# Patient Record
Sex: FEMALE | Race: BLACK OR AFRICAN AMERICAN | HISPANIC OR LATINO | Employment: FULL TIME | URBAN - METROPOLITAN AREA
[De-identification: names, ages, dates, MRNs, and addresses within clinical notes are randomized per-mention and may not be internally consistent; named-entity substitution may affect disease eponyms.]

---

## 2021-05-18 ENCOUNTER — OFFICE VISIT (OUTPATIENT)
Dept: OBGYN CLINIC | Facility: CLINIC | Age: 26
End: 2021-05-18

## 2021-05-18 VITALS
HEIGHT: 63 IN | SYSTOLIC BLOOD PRESSURE: 111 MMHG | WEIGHT: 148.2 LBS | HEART RATE: 89 BPM | DIASTOLIC BLOOD PRESSURE: 69 MMHG | BODY MASS INDEX: 26.26 KG/M2

## 2021-05-18 DIAGNOSIS — Z01.419 ENCOUNTER FOR ANNUAL ROUTINE GYNECOLOGICAL EXAMINATION: Primary | ICD-10-CM

## 2021-05-18 PROCEDURE — G0145 SCR C/V CYTO,THINLAYER,RESCR: HCPCS | Performed by: NURSE PRACTITIONER

## 2021-05-18 PROCEDURE — 99385 PREV VISIT NEW AGE 18-39: CPT | Performed by: NURSE PRACTITIONER

## 2021-05-18 NOTE — LETTER
2021    To Blaine CASTANO: 1995      This letter is to advise you that your recent PAP SMEAR results were reviewed by me and are NORMAL    We will see you in 1 year for your annual exam     Jennifer Goodson

## 2021-05-18 NOTE — PATIENT INSTRUCTIONS
PAP results can take up to 2 weeks  Call with needs or concerns  Return in 1 year    COVID-19 Instructions    If you are having any of the following:  Cough   Shortness of breath   Fever  If traveled within past 2 weeks internationally or to high risk [place] states  Or been in contact with someone that has     Please call either:   Your PCP office  [phone], option [phone]    They will screen you over the phone and direct you to the nearest appropriate testing location    DO NOT go to your PCP or OB office without calling first

## 2021-05-18 NOTE — PROGRESS NOTES
Annual Exam    Assessment   1  Encounter for annual routine gynecological examination  Liquid-based pap, screening     well woman       Plan       All questions answered  Breast self exam technique reviewed and patient encouraged to perform self-exam monthly  Contraception: abstinence  Discussed healthy lifestyle modifications  Follow up in 1 year  Thin prep Pap smear  Patient Instructions   PAP results can take up to 2 weeks  Call with needs or concerns  Return in 1 year  Pt verbalized understanding of all discussed  Subjective      Jorge Renteria is a 31 y o  No obstetric history on file  female who presents for annual well woman exam  Periods are regular every 28-30 days, lasting 5 days  No intermenstrual bleeding, spotting, or discharge  First PAP today  No current partner, last intercourse was 2 years ago, was using condoms    Current contraception: abstinence  History of abnormal Pap smear: First today  Family history of uterine or ovarian cancer: no  Regular self breast exam: yes  History of abnormal mammogram: N/A  Family history of breast cancer: no  History of abnormal lipids: unknown  Menstrual History:  OB History        0    Para   0    Term   0       0    AB   0    Living   0       SAB   0    TAB   0    Ectopic   0    Multiple   0    Live Births   0                Menarche age: 14  Patient's last menstrual period was 2021  Period Pattern: Regular    The following portions of the patient's history were reviewed and updated as appropriate: allergies, current medications, past family history, past medical history, past social history, past surgical history and problem list     Review of Systems  Pertinent items are noted in HPI        Objective      /69   Pulse 89   Ht 5' 3" (1 6 m)   Wt 67 2 kg (148 lb 3 2 oz)   LMP 2021   BMI 26 25 kg/m²     General: alert and oriented, in no acute distress, alert, appears stated age and cooperative   Heart: regular rate and rhythm, S1, S2 normal, no murmur, click, rub or gallop   Lungs: clear to auscultation bilaterally, WNL respiratory effort, negative cough or SOB   Thyroid: Negative masses   Abdomen: soft, non-tender, without masses or organomegaly   Vulva: normal   Vagina: normal mucosa   Cervix: no bleeding following Pap, no cervical motion tenderness and no lesions   Uterus: normal size, non-tender, normal shape and consistency   Adnexa: normal adnexa   Urethra: normal   Breasts: NT,negative masses, discharge, or dimpling

## 2021-05-21 LAB
LAB AP GYN PRIMARY INTERPRETATION: NORMAL
Lab: NORMAL

## 2021-06-01 ENCOUNTER — OFFICE VISIT (OUTPATIENT)
Dept: FAMILY MEDICINE CLINIC | Facility: CLINIC | Age: 26
End: 2021-06-01

## 2021-06-01 ENCOUNTER — APPOINTMENT (OUTPATIENT)
Dept: LAB | Facility: CLINIC | Age: 26
End: 2021-06-01
Payer: COMMERCIAL

## 2021-06-01 VITALS
WEIGHT: 148.9 LBS | SYSTOLIC BLOOD PRESSURE: 110 MMHG | TEMPERATURE: 97.3 F | OXYGEN SATURATION: 98 % | RESPIRATION RATE: 18 BRPM | HEIGHT: 63 IN | BODY MASS INDEX: 26.38 KG/M2 | DIASTOLIC BLOOD PRESSURE: 68 MMHG | HEART RATE: 82 BPM

## 2021-06-01 DIAGNOSIS — Z11.4 ENCOUNTER FOR SCREENING FOR HIV: ICD-10-CM

## 2021-06-01 DIAGNOSIS — M79.672 LEFT FOOT PAIN: Primary | ICD-10-CM

## 2021-06-01 DIAGNOSIS — Z11.59 ENCOUNTER FOR HEPATITIS C SCREENING TEST FOR LOW RISK PATIENT: ICD-10-CM

## 2021-06-01 DIAGNOSIS — E66.3 OVERWEIGHT (BMI 25.0-29.9): ICD-10-CM

## 2021-06-01 DIAGNOSIS — Z13.220 SCREENING FOR HYPERLIPIDEMIA: ICD-10-CM

## 2021-06-01 DIAGNOSIS — Z13.1 SCREENING FOR DIABETES MELLITUS (DM): ICD-10-CM

## 2021-06-01 LAB
ALBUMIN SERPL BCP-MCNC: 3.7 G/DL (ref 3.5–5)
ALP SERPL-CCNC: 49 U/L (ref 46–116)
ALT SERPL W P-5'-P-CCNC: 17 U/L (ref 12–78)
ANION GAP SERPL CALCULATED.3IONS-SCNC: 5 MMOL/L (ref 4–13)
AST SERPL W P-5'-P-CCNC: 19 U/L (ref 5–45)
BASOPHILS # BLD AUTO: 0.08 THOUSANDS/ΜL (ref 0–0.1)
BASOPHILS NFR BLD AUTO: 1 % (ref 0–1)
BILIRUB SERPL-MCNC: 0.78 MG/DL (ref 0.2–1)
BUN SERPL-MCNC: 14 MG/DL (ref 5–25)
CALCIUM SERPL-MCNC: 9.1 MG/DL (ref 8.3–10.1)
CHLORIDE SERPL-SCNC: 106 MMOL/L (ref 100–108)
CHOLEST SERPL-MCNC: 140 MG/DL (ref 50–200)
CO2 SERPL-SCNC: 27 MMOL/L (ref 21–32)
CREAT SERPL-MCNC: 0.7 MG/DL (ref 0.6–1.3)
EOSINOPHIL # BLD AUTO: 0.29 THOUSAND/ΜL (ref 0–0.61)
EOSINOPHIL NFR BLD AUTO: 4 % (ref 0–6)
ERYTHROCYTE [DISTWIDTH] IN BLOOD BY AUTOMATED COUNT: 11.9 % (ref 11.6–15.1)
GFR SERPL CREATININE-BSD FRML MDRD: 138 ML/MIN/1.73SQ M
GLUCOSE SERPL-MCNC: 59 MG/DL (ref 65–140)
HCT VFR BLD AUTO: 41.3 % (ref 34.8–46.1)
HCV AB SER QL: NORMAL
HDLC SERPL-MCNC: 55 MG/DL
HGB BLD-MCNC: 13.3 G/DL (ref 11.5–15.4)
IMM GRANULOCYTES # BLD AUTO: 0.02 THOUSAND/UL (ref 0–0.2)
IMM GRANULOCYTES NFR BLD AUTO: 0 % (ref 0–2)
LDLC SERPL CALC-MCNC: 69 MG/DL (ref 0–100)
LYMPHOCYTES # BLD AUTO: 2.25 THOUSANDS/ΜL (ref 0.6–4.47)
LYMPHOCYTES NFR BLD AUTO: 31 % (ref 14–44)
MCH RBC QN AUTO: 32.4 PG (ref 26.8–34.3)
MCHC RBC AUTO-ENTMCNC: 32.2 G/DL (ref 31.4–37.4)
MCV RBC AUTO: 101 FL (ref 82–98)
MONOCYTES # BLD AUTO: 0.62 THOUSAND/ΜL (ref 0.17–1.22)
MONOCYTES NFR BLD AUTO: 9 % (ref 4–12)
NEUTROPHILS # BLD AUTO: 3.93 THOUSANDS/ΜL (ref 1.85–7.62)
NEUTS SEG NFR BLD AUTO: 55 % (ref 43–75)
NONHDLC SERPL-MCNC: 85 MG/DL
NRBC BLD AUTO-RTO: 0 /100 WBCS
PLATELET # BLD AUTO: 339 THOUSANDS/UL (ref 149–390)
PMV BLD AUTO: 10.2 FL (ref 8.9–12.7)
POTASSIUM SERPL-SCNC: 3.5 MMOL/L (ref 3.5–5.3)
PROT SERPL-MCNC: 7.9 G/DL (ref 6.4–8.2)
RBC # BLD AUTO: 4.11 MILLION/UL (ref 3.81–5.12)
SODIUM SERPL-SCNC: 138 MMOL/L (ref 136–145)
TRIGL SERPL-MCNC: 78 MG/DL
TSH SERPL DL<=0.05 MIU/L-ACNC: 1.46 UIU/ML (ref 0.36–3.74)
WBC # BLD AUTO: 7.19 THOUSAND/UL (ref 4.31–10.16)

## 2021-06-01 PROCEDURE — 86803 HEPATITIS C AB TEST: CPT

## 2021-06-01 PROCEDURE — 80053 COMPREHEN METABOLIC PANEL: CPT

## 2021-06-01 PROCEDURE — 36415 COLL VENOUS BLD VENIPUNCTURE: CPT

## 2021-06-01 PROCEDURE — 1036F TOBACCO NON-USER: CPT | Performed by: FAMILY MEDICINE

## 2021-06-01 PROCEDURE — 99204 OFFICE O/P NEW MOD 45 MIN: CPT | Performed by: FAMILY MEDICINE

## 2021-06-01 PROCEDURE — 84443 ASSAY THYROID STIM HORMONE: CPT

## 2021-06-01 PROCEDURE — 87389 HIV-1 AG W/HIV-1&-2 AB AG IA: CPT

## 2021-06-01 PROCEDURE — 3008F BODY MASS INDEX DOCD: CPT | Performed by: FAMILY MEDICINE

## 2021-06-01 PROCEDURE — 85025 COMPLETE CBC W/AUTO DIFF WBC: CPT

## 2021-06-01 PROCEDURE — 80061 LIPID PANEL: CPT

## 2021-06-01 PROCEDURE — 3725F SCREEN DEPRESSION PERFORMED: CPT | Performed by: FAMILY MEDICINE

## 2021-06-01 NOTE — ASSESSMENT & PLAN NOTE
BMI Counseling: Body mass index is 26 38 kg/m²  The BMI is above normal  Nutrition recommendations include reducing portion sizes, decreasing overall calorie intake, 3-5 servings of fruits/vegetables daily, reducing fast food intake, consuming healthier snacks and decreasing soda and/or juice intake  Exercise recommendations include moderate aerobic physical activity for 150 minutes/week

## 2021-06-02 LAB — HIV 1+2 AB+HIV1 P24 AG SERPL QL IA: NORMAL

## 2021-06-25 ENCOUNTER — TELEPHONE (OUTPATIENT)
Dept: FAMILY MEDICINE CLINIC | Facility: CLINIC | Age: 26
End: 2021-06-25

## 2021-06-25 NOTE — TELEPHONE ENCOUNTER
Please schedule a NP appointment for podiatry for  LEFT FOOT PAIN  Patient is expecting your phone call

## 2021-09-21 ENCOUNTER — OFFICE VISIT (OUTPATIENT)
Dept: OBGYN CLINIC | Facility: CLINIC | Age: 26
End: 2021-09-21

## 2021-09-21 VITALS
HEART RATE: 72 BPM | DIASTOLIC BLOOD PRESSURE: 79 MMHG | WEIGHT: 151 LBS | SYSTOLIC BLOOD PRESSURE: 116 MMHG | BODY MASS INDEX: 26.75 KG/M2

## 2021-09-21 DIAGNOSIS — R35.0 FREQUENT URINATION: ICD-10-CM

## 2021-09-21 DIAGNOSIS — R30.0 DYSURIA: Primary | ICD-10-CM

## 2021-09-21 LAB
BACTERIA UR QL AUTO: ABNORMAL /HPF
BILIRUB UR QL STRIP: NEGATIVE
CLARITY UR: CLEAR
COLOR UR: YELLOW
GLUCOSE UR STRIP-MCNC: NEGATIVE MG/DL
HGB UR QL STRIP.AUTO: NEGATIVE
HYALINE CASTS #/AREA URNS LPF: ABNORMAL /LPF
KETONES UR STRIP-MCNC: NEGATIVE MG/DL
LEUKOCYTE ESTERASE UR QL STRIP: ABNORMAL
NITRITE UR QL STRIP: NEGATIVE
NON-SQ EPI CELLS URNS QL MICRO: ABNORMAL /HPF
PH UR STRIP.AUTO: 6.5 [PH]
PROT UR STRIP-MCNC: NEGATIVE MG/DL
RBC #/AREA URNS AUTO: ABNORMAL /HPF
SL AMB  POCT GLUCOSE, UA: NORMAL
SL AMB LEUKOCYTE ESTERASE,UA: NORMAL
SL AMB POCT BILIRUBIN,UA: NORMAL
SL AMB POCT BLOOD,UA: NORMAL
SL AMB POCT CLARITY,UA: CLEAR
SL AMB POCT COLOR,UA: YELLOW
SL AMB POCT KETONES,UA: NORMAL
SL AMB POCT NITRITE,UA: NORMAL
SL AMB POCT PH,UA: 5
SL AMB POCT SPECIFIC GRAVITY,UA: 1.01
SL AMB POCT URINE PROTEIN: NORMAL
SL AMB POCT UROBILINOGEN: NORMAL
SP GR UR STRIP.AUTO: 1.01 (ref 1–1.03)
UROBILINOGEN UR QL STRIP.AUTO: 0.2 E.U./DL
WBC #/AREA URNS AUTO: ABNORMAL /HPF

## 2021-09-21 PROCEDURE — 87077 CULTURE AEROBIC IDENTIFY: CPT | Performed by: NURSE PRACTITIONER

## 2021-09-21 PROCEDURE — 81002 URINALYSIS NONAUTO W/O SCOPE: CPT | Performed by: NURSE PRACTITIONER

## 2021-09-21 PROCEDURE — 1036F TOBACCO NON-USER: CPT | Performed by: NURSE PRACTITIONER

## 2021-09-21 PROCEDURE — 81001 URINALYSIS AUTO W/SCOPE: CPT | Performed by: NURSE PRACTITIONER

## 2021-09-21 PROCEDURE — 87086 URINE CULTURE/COLONY COUNT: CPT | Performed by: NURSE PRACTITIONER

## 2021-09-21 PROCEDURE — 87186 SC STD MICRODIL/AGAR DIL: CPT | Performed by: NURSE PRACTITIONER

## 2021-09-21 PROCEDURE — 99213 OFFICE O/P EST LOW 20 MIN: CPT | Performed by: NURSE PRACTITIONER

## 2021-09-21 NOTE — PROGRESS NOTES
Assessment/Plan:         Diagnoses and all orders for this visit:    Dysuria  -     POCT urine dip  -     Urine culture; Future  -     UA (URINE) with reflex to Scope; Future    Frequent urination  -     Urine culture; Future  -     UA (URINE) with reflex to Scope; Future      Plan  Increase water intake  Follow up with Family doctor if urinary symptoms persist  You will be called if urine sent to lab is positive for a urinary tract infection  Annual GYN exam is due in May  Pt verbalized understanding of all discussed  Subjective:      Patient ID: Bart Hyde is a 31 y o  female  HPI  Pt presents with C/O urinary frequency x several day abd intermittent burning with urination  Last WNL PAP was May 2021     Explained urine dip was negative  Advised pt to increase water intake  Explained urine would be sent to lab, if it is positive for UTI she would be called  Pt is to call Family doctor iUTI symptoms persist        The following portions of the patient's history were reviewed and updated as appropriate: allergies, current medications, past family history, past medical history, past social history, past surgical history and problem list     Review of Systems      Pertinent items are note in the HPI    Objective:      /79   Pulse 72   Wt 68 5 kg (151 lb)   LMP 09/14/2021 (Approximate)   BMI 26 75 kg/m²          Physical Exam  Vitals reviewed  Constitutional:       Appearance: Normal appearance  Eyes:      General:         Right eye: No discharge  Left eye: No discharge  Pulmonary:      Effort: Pulmonary effort is normal  No respiratory distress  Musculoskeletal:         General: Normal range of motion  Cervical back: Normal range of motion  Neurological:      Mental Status: She is alert and oriented to person, place, and time  Psychiatric:         Mood and Affect: Mood normal          Behavior: Behavior normal          Thought Content:  Thought content normal  negative cough or SOB  Urine dip was negative

## 2021-09-21 NOTE — PATIENT INSTRUCTIONS
Increase water intake  Follow up with Family doctor if urinary symptoms persist  You will be called if urine sent to lab is positive for a urinary tract infection  Annual GYN exam is due in May    COVID-19 Instructions    If you are having any of the following:  Cough   Shortness of breath   Fever  If traveled within past 2 weeks internationally or to high risk [place] states  Or been in contact with someone that has     Please call either:   Your PCP office  [phone], option [phone]    They will screen you over the phone and direct you to the nearest appropriate testing location    DO NOT go to your PCP or OB office without calling first

## 2021-09-25 LAB — BACTERIA UR CULT: ABNORMAL

## 2021-09-27 ENCOUNTER — TELEPHONE (OUTPATIENT)
Dept: OBGYN CLINIC | Facility: CLINIC | Age: 26
End: 2021-09-27

## 2021-09-27 NOTE — TELEPHONE ENCOUNTER
----- Message from Sadiq Salcido, [address] sent at 9/27/2021  7:49 AM EDT -----  UTI, Bactrim DS ordered q 12 hours x 3 days

## 2022-05-24 ENCOUNTER — ANNUAL EXAM (OUTPATIENT)
Dept: OBGYN CLINIC | Facility: CLINIC | Age: 27
End: 2022-05-24

## 2022-05-24 VITALS
WEIGHT: 163 LBS | SYSTOLIC BLOOD PRESSURE: 112 MMHG | BODY MASS INDEX: 28.87 KG/M2 | HEART RATE: 88 BPM | DIASTOLIC BLOOD PRESSURE: 74 MMHG

## 2022-05-24 DIAGNOSIS — Z01.419 ENCOUNTER FOR ANNUAL ROUTINE GYNECOLOGICAL EXAMINATION: Primary | ICD-10-CM

## 2022-05-24 PROCEDURE — 99395 PREV VISIT EST AGE 18-39: CPT | Performed by: NURSE PRACTITIONER

## 2022-05-24 PROCEDURE — 0503F POSTPARTUM CARE VISIT: CPT | Performed by: NURSE PRACTITIONER

## 2022-05-24 PROCEDURE — 3725F SCREEN DEPRESSION PERFORMED: CPT | Performed by: NURSE PRACTITIONER

## 2022-05-24 PROCEDURE — 1036F TOBACCO NON-USER: CPT | Performed by: NURSE PRACTITIONER

## 2022-05-24 NOTE — PROGRESS NOTES
Annual Exam    Assessment   1  Encounter for annual routine gynecological examination       well woman       Plan       All questions answered  Breast self exam technique reviewed and patient encouraged to perform self-exam monthly  Contraception: OCP (estrogen/progesterone)  Discussed healthy lifestyle modifications  Follow up in 1 year  Patient Instructions   Continue birth control pills as directed at [facility]  Call with needs or concerns  Return in 1 year  Pt verbalized understanding of all discussed  Subjective      Reagan El is a 31 y o  [place] female who presents for annual well woman exam  Periods are regular every 28-30 days, lasting 5 days  No intermenstrual bleeding, spotting, or discharge  Last wNL PAP 2021  1 partner, denies domestic violence    Depression Screening Follow-up Plan: Patient's depression screening was negative with a PHQ-2 score of 0  Their PHQ-9 score was 0   Clinically patient does not have depression  No treatment is required  Current contraception: OCP (estrogen/progesterone)  History of abnormal Pap smear: no  Family history of uterine or ovarian cancer: no  Regular self breast exam: yes  History of abnormal mammogram: N/A  Family history of breast cancer: no  History of abnormal lipids: unknown  Menstrual History:  OB History        0    Para   0    Term   0       0    AB   0    Living   0       SAB   0    IAB   0    Ectopic   0    Multiple   0    Live Births   0                Menarche age: 14  Patient's last menstrual period was 2022  The following portions of the patient's history were reviewed and updated as appropriate: allergies, current medications, past family history, past medical history, past social history, past surgical history and problem list     Review of Systems  Pertinent items are noted in HPI        Objective      /74   Pulse 88   Wt 73 9 kg (163 lb)   LMP 2022   BMI 28 87 kg/m² General: alert and oriented, in no acute distress, alert, appears stated age and cooperative   Heart: regular rate and rhythm, S1, S2 normal, no murmur, click, rub or gallop   Lungs: clear to auscultation bilaterally, WNL respiratory effort, negative cough or SOB   Thyroid: Negative masses   Abdomen: soft, non-tender, without masses or organomegaly   Vulva: normal   Vagina: normal mucosa, small amount of dark blood in the vagina   Cervix: no cervical motion tenderness and no lesions   Uterus: normal size, non-tender, normal shape and consistency   Adnexa: normal adnexa   Urethra: normal   Breasts: NT,negative masses, discharge, or dimpling

## 2022-05-24 NOTE — PATIENT INSTRUCTIONS
Continue birth control pills as directed at [facility]  Call with needs or concerns  Return in 1 year    COVID-19 Instructions    If you are having any of the following:  Cough   Shortness of breath   Fever  If traveled within past 2 weeks internationally or to high risk [place] states  Or been in contact with someone that has     Please call either:   Your PCP office  [phone], option [phone]    They will screen you over the phone and direct you to the nearest appropriate testing location    DO NOT go to your PCP or OB office without calling first       Kaleb Orantes

## 2022-09-22 ENCOUNTER — OFFICE VISIT (OUTPATIENT)
Dept: OBGYN CLINIC | Facility: CLINIC | Age: 27
End: 2022-09-22

## 2022-09-22 VITALS
HEART RATE: 83 BPM | HEIGHT: 64 IN | BODY MASS INDEX: 23.22 KG/M2 | WEIGHT: 136 LBS | DIASTOLIC BLOOD PRESSURE: 77 MMHG | SYSTOLIC BLOOD PRESSURE: 119 MMHG

## 2022-09-22 DIAGNOSIS — N93.9 ABNORMAL UTERINE BLEEDING (AUB): Primary | ICD-10-CM

## 2022-09-22 PROCEDURE — 99213 OFFICE O/P EST LOW 20 MIN: CPT | Performed by: OBSTETRICS & GYNECOLOGY

## 2022-09-22 RX ORDER — NORETHINDRONE ACETATE AND ETHINYL ESTRADIOL 1; .02 MG/1; MG/1
1 TABLET ORAL DAILY
Qty: 28 TABLET | Refills: 11 | Status: SHIPPED | OUTPATIENT
Start: 2022-09-22

## 2022-09-22 RX ORDER — MEDROXYPROGESTERONE ACETATE 10 MG/1
10 TABLET ORAL DAILY
Qty: 5 TABLET | Refills: 0 | Status: SHIPPED | OUTPATIENT
Start: 2022-09-22

## 2022-09-22 NOTE — PATIENT INSTRUCTIONS
[place] por moran confianza en nuestro equipo  Tricia Hendricks y agradecemos zahira comentarios  Si recibe douglas encuesta nuestra, tómese unos momentos para informarnos cómo estamos     Sinceramente,  Hardik Moss DO

## 2022-09-22 NOTE — PROGRESS NOTES
PROBLEM GYNECOLOGICAL VISIT    Gilma Canada is a 28 y o  female who presents today with complaint of with c/o abnormal bleeding  Her general medical history has been reviewed and she reports it as follows:    No past medical history on file  No past surgical history on file  OB History        0    Para   0    Term   0       0    AB   0    Living   0       SAB   0    IAB   0    Ectopic   0    Multiple   0    Live Births   0               Social History     Tobacco Use    Smoking status: Never Smoker    Smokeless tobacco: Never Used   Vaping Use    Vaping Use: Never used   Substance Use Topics    Alcohol use: Never    Drug use: Never     Social History     Substance and Sexual Activity   Sexual Activity Yes    Partners: Male       No current outpatient medications    History of Present Illness:   Patient presents with c/o menses started  to the present on OCP which she has been on 4mos which has caused her to be nauseous  Requesting a different OCP  Review of Systems:  Review of Systems   Gastrointestinal: Positive for nausea  Genitourinary: Positive for menstrual problem  AUB   All other systems reviewed and are negative  Physical Exam:  /77   Pulse 83   Ht 5' 4" (1 626 m)   Wt 61 7 kg (136 lb)   LMP 2022   BMI 23 34 kg/m²   Physical Exam  Constitutional:       Appearance: Normal appearance  Genitourinary:      Bladder and urethral meatus normal       No lesions in the vagina  Right Labia: No rash, tenderness or lesions  Left Labia: No tenderness, lesions or rash  Vaginal bleeding present  Right Adnexa: not tender and not full  Left Adnexa: not tender, not full and no mass present  Cervical discharge and lesion present  No cervical motion tenderness  Uterus is not enlarged or tender  No uterine mass detected  No urethral tenderness or mass present  Neurological:      Mental Status: She is alert  Vitals reviewed  Discussion:  Recommend a pelvic sonogram for further evaluation of bleeding  Discuss with patient possible causes uterine polyps or fibroids and will switch OCP  Provera will be given for 5days and start OCP this coming Sunday  Assessment:   1  AUB   2  Nausea associated with OCP    Plan:   1  Escribe provera and junel   2  Imaging ordered: pelvic sonogram   3  Return to office 3wks  Reviewed with patient that [facility], but that they will not necessarily be reviewed by me immediately  Explained that I will review results at my earliest opportunity and contact patient appropriately

## 2022-09-23 ENCOUNTER — HOSPITAL ENCOUNTER (OUTPATIENT)
Dept: ULTRASOUND IMAGING | Facility: HOSPITAL | Age: 27
End: 2022-09-23
Payer: COMMERCIAL

## 2022-09-23 DIAGNOSIS — N93.9 ABNORMAL UTERINE BLEEDING (AUB): ICD-10-CM

## 2022-09-23 PROCEDURE — 76830 TRANSVAGINAL US NON-OB: CPT

## 2022-09-23 PROCEDURE — 76856 US EXAM PELVIC COMPLETE: CPT

## 2022-10-03 ENCOUNTER — APPOINTMENT (OUTPATIENT)
Dept: LAB | Facility: CLINIC | Age: 27
End: 2022-10-03
Payer: COMMERCIAL

## 2022-10-03 ENCOUNTER — OFFICE VISIT (OUTPATIENT)
Dept: FAMILY MEDICINE CLINIC | Facility: CLINIC | Age: 27
End: 2022-10-03

## 2022-10-03 ENCOUNTER — TELEPHONE (OUTPATIENT)
Dept: OBGYN CLINIC | Facility: CLINIC | Age: 27
End: 2022-10-03

## 2022-10-03 VITALS
HEART RATE: 86 BPM | RESPIRATION RATE: 19 BRPM | TEMPERATURE: 98.7 F | HEIGHT: 64 IN | DIASTOLIC BLOOD PRESSURE: 80 MMHG | SYSTOLIC BLOOD PRESSURE: 120 MMHG | OXYGEN SATURATION: 98 % | WEIGHT: 166 LBS | BODY MASS INDEX: 28.34 KG/M2

## 2022-10-03 DIAGNOSIS — M79.672 LEFT FOOT PAIN: ICD-10-CM

## 2022-10-03 DIAGNOSIS — Z11.3 SCREEN FOR STD (SEXUALLY TRANSMITTED DISEASE): ICD-10-CM

## 2022-10-03 DIAGNOSIS — R42 LIGHTHEADED: ICD-10-CM

## 2022-10-03 DIAGNOSIS — R42 LIGHTHEADED: Primary | ICD-10-CM

## 2022-10-03 DIAGNOSIS — Z23 NEED FOR VACCINATION: ICD-10-CM

## 2022-10-03 LAB
25(OH)D3 SERPL-MCNC: 29.3 NG/ML (ref 30–100)
ALBUMIN SERPL BCP-MCNC: 3.7 G/DL (ref 3.5–5)
ALP SERPL-CCNC: 46 U/L (ref 46–116)
ALT SERPL W P-5'-P-CCNC: 21 U/L (ref 12–78)
ANION GAP SERPL CALCULATED.3IONS-SCNC: 5 MMOL/L (ref 4–13)
AST SERPL W P-5'-P-CCNC: 19 U/L (ref 5–45)
BASOPHILS # BLD AUTO: 0.06 THOUSANDS/ΜL (ref 0–0.1)
BASOPHILS NFR BLD AUTO: 1 % (ref 0–1)
BILIRUB SERPL-MCNC: 0.32 MG/DL (ref 0.2–1)
BUN SERPL-MCNC: 15 MG/DL (ref 5–25)
CALCIUM SERPL-MCNC: 9.4 MG/DL (ref 8.3–10.1)
CHLORIDE SERPL-SCNC: 109 MMOL/L (ref 96–108)
CO2 SERPL-SCNC: 25 MMOL/L (ref 21–32)
CREAT SERPL-MCNC: 0.98 MG/DL (ref 0.6–1.3)
EOSINOPHIL # BLD AUTO: 0.2 THOUSAND/ΜL (ref 0–0.61)
EOSINOPHIL NFR BLD AUTO: 2 % (ref 0–6)
ERYTHROCYTE [DISTWIDTH] IN BLOOD BY AUTOMATED COUNT: 11.9 % (ref 11.6–15.1)
GFR SERPL CREATININE-BSD FRML MDRD: 78 ML/MIN/1.73SQ M
GLUCOSE P FAST SERPL-MCNC: 55 MG/DL (ref 65–99)
HCT VFR BLD AUTO: 42.4 % (ref 34.8–46.1)
HCV AB SER QL: NORMAL
HGB BLD-MCNC: 13.3 G/DL (ref 11.5–15.4)
IMM GRANULOCYTES # BLD AUTO: 0.02 THOUSAND/UL (ref 0–0.2)
IMM GRANULOCYTES NFR BLD AUTO: 0 % (ref 0–2)
LYMPHOCYTES # BLD AUTO: 2.61 THOUSANDS/ΜL (ref 0.6–4.47)
LYMPHOCYTES NFR BLD AUTO: 30 % (ref 14–44)
MCH RBC QN AUTO: 30.6 PG (ref 26.8–34.3)
MCHC RBC AUTO-ENTMCNC: 31.4 G/DL (ref 31.4–37.4)
MCV RBC AUTO: 98 FL (ref 82–98)
MONOCYTES # BLD AUTO: 0.8 THOUSAND/ΜL (ref 0.17–1.22)
MONOCYTES NFR BLD AUTO: 9 % (ref 4–12)
NEUTROPHILS # BLD AUTO: 5 THOUSANDS/ΜL (ref 1.85–7.62)
NEUTS SEG NFR BLD AUTO: 58 % (ref 43–75)
NRBC BLD AUTO-RTO: 0 /100 WBCS
PLATELET # BLD AUTO: 406 THOUSANDS/UL (ref 149–390)
PMV BLD AUTO: 9.9 FL (ref 8.9–12.7)
POTASSIUM SERPL-SCNC: 3.9 MMOL/L (ref 3.5–5.3)
PROT SERPL-MCNC: 8.3 G/DL (ref 6.4–8.4)
RBC # BLD AUTO: 4.34 MILLION/UL (ref 3.81–5.12)
SODIUM SERPL-SCNC: 139 MMOL/L (ref 135–147)
TSH SERPL DL<=0.05 MIU/L-ACNC: 0.99 UIU/ML (ref 0.45–4.5)
WBC # BLD AUTO: 8.69 THOUSAND/UL (ref 4.31–10.16)

## 2022-10-03 PROCEDURE — 87591 N.GONORRHOEAE DNA AMP PROB: CPT

## 2022-10-03 PROCEDURE — 82306 VITAMIN D 25 HYDROXY: CPT

## 2022-10-03 PROCEDURE — 99395 PREV VISIT EST AGE 18-39: CPT | Performed by: FAMILY MEDICINE

## 2022-10-03 PROCEDURE — 87491 CHLMYD TRACH DNA AMP PROBE: CPT

## 2022-10-03 PROCEDURE — 36415 COLL VENOUS BLD VENIPUNCTURE: CPT

## 2022-10-03 PROCEDURE — 90686 IIV4 VACC NO PRSV 0.5 ML IM: CPT | Performed by: FAMILY MEDICINE

## 2022-10-03 PROCEDURE — 86592 SYPHILIS TEST NON-TREP QUAL: CPT

## 2022-10-03 PROCEDURE — 85025 COMPLETE CBC W/AUTO DIFF WBC: CPT

## 2022-10-03 PROCEDURE — 90471 IMMUNIZATION ADMIN: CPT | Performed by: FAMILY MEDICINE

## 2022-10-03 PROCEDURE — 90651 9VHPV VACCINE 2/3 DOSE IM: CPT | Performed by: FAMILY MEDICINE

## 2022-10-03 PROCEDURE — 86803 HEPATITIS C AB TEST: CPT

## 2022-10-03 PROCEDURE — 80053 COMPREHEN METABOLIC PANEL: CPT

## 2022-10-03 PROCEDURE — 84443 ASSAY THYROID STIM HORMONE: CPT

## 2022-10-03 PROCEDURE — 90472 IMMUNIZATION ADMIN EACH ADD: CPT | Performed by: FAMILY MEDICINE

## 2022-10-03 NOTE — PROGRESS NOTES
[address] [facility] JAKY    NAME: Kecia Young  AGE: 28 y o  SEX: female  : 1995     DATE: 10/3/2022     Assessment and Plan:     Problem List Items Addressed This Visit        Other    Left foot pain    Relevant Orders    Ambulatory Referral to Podiatry      Other Visit Diagnoses     Lightheaded    -  Primary    Relevant Orders    CBC and differential    Comprehensive metabolic panel    Vitamin D 25 hydroxy    TSH, 3rd generation with Free T4 reflex    Screen for STD (sexually transmitted disease)        Relevant Orders    Chlamydia/GC amplified DNA by PCR    RPR    Hepatitis C antibody    Need for vaccination        Relevant Orders    influenza vaccine, quadrivalent, 0 5 mL, preservative-free, for adult and pediatric patients 6 mos+ (AFLURIA, FLUARIX, FLULAVAL, FLUZONE) (Completed)      Patient requesting HPV vaccine, however unsure of weather it is covered by insurance  She would like to hold off and she is going to reach out to the insurance to inquiry if it is covered  She will let us know, if it is covered she will call to make a nurse visit for HPV vaccine     Immunizations and preventive care screenings were discussed with patient today  Appropriate education was printed on patient's after visit summary  Counseling:  Sexual health: discussed sexually transmitted diseases, partner selection, use of condoms, avoidance of unintended pregnancy, and contraceptive alternatives  · Exercise: the importance of regular exercise/physical activity was discussed  Recommend exercise 3-5 times per week for at least 30 minutes  No follow-ups on file  Chief Complaint:     Chief Complaint   Patient presents with    Physical Exam      History of Present Illness:     Adult Annual Physical   Patient here for a comprehensive physical exam  The patient reports problems - headache, nausea      Diet and Physical Activity  · Diet/Nutrition: well balanced diet  · Exercise: walking  Depression Screening  PHQ-2/9 Depression Screening         General Health  · Sleep: sleeps well  · Hearing: normal - bilateral   · Vision: most recent eye exam >1 year ago and wears glasses  · Dental: no dental visits for >1 year  Review of Systems:     Review of Systems   Gastrointestinal: Positive for nausea  Musculoskeletal:        Foot pain   Neurological: Positive for headaches  All other systems reviewed and are negative  Past Medical History:     No past medical history on file  Past Surgical History:     No past surgical history on file  Social History:     Social History     Socioeconomic History    Marital status: Single     Spouse name: None    Number of children: None    Years of education: None    Highest education level: None   Occupational History    None   Tobacco Use    Smoking status: Never Smoker    Smokeless tobacco: Never Used   Vaping Use    Vaping Use: Never used   Substance and Sexual Activity    Alcohol use: Never    Drug use: Never    Sexual activity: Yes     Partners: Male   Other Topics Concern    None   Social History Narrative    None     Social Determinants of Health     Financial Resource Strain: Low Risk     Difficulty of Paying Living Expenses: Not hard at all   Food Insecurity: No Food Insecurity    Worried About Running Out of Food in the Last Year: Never true    [address] in the Last Year: Never true   Transportation Needs: No Transportation Needs    Lack of Transportation (Medical): No    Lack of Transportation (Non-Medical):  No   Physical Activity: Not on file   Stress: Not on file   Social Connections: Not on file   Intimate Partner Violence: Not on file   Housing Stability: Not on file      Family History:     Family History   Problem Relation Age of Onset    No Known Problems Mother     No Known Problems Father     No Known Problems Sister     No Known Problems Brother       Current Medications:     Current Outpatient Medications   Medication Sig Dispense Refill    medroxyPROGESTERone (PROVERA) 10 mg tablet Take 1 tablet (10 mg total) by mouth daily 5 tablet 0    norethindrone-ethinyl estradiol (Junel 1/20) 1-20 MG-MCG per tablet Take 1 tablet by mouth daily 28 tablet 11     No current facility-administered medications for this visit  Allergies:     No Known Allergies   Physical Exam:     /80 (BP Location: Left arm, Patient Position: Sitting, Cuff Size: Adult)   Pulse 86   Temp 98 7 °F (37 1 °C) (Temporal)   Resp 19   Ht 5' 4" (1 626 m)   Wt 75 3 kg (166 lb)   LMP 09/04/2022   SpO2 98%   BMI 28 49 kg/m²     Physical Exam  Vitals and nursing note reviewed  Constitutional:       General: She is not in acute distress  Appearance: She is well-developed  HENT:      Head: Normocephalic and atraumatic  Eyes:      Conjunctiva/sclera: Conjunctivae normal    Cardiovascular:      Rate and Rhythm: Normal rate and regular rhythm  Heart sounds: No murmur heard  Pulmonary:      Effort: Pulmonary effort is normal  No respiratory distress  Breath sounds: Normal breath sounds  Abdominal:      Palpations: Abdomen is soft  Tenderness: There is no abdominal tenderness  Musculoskeletal:      Cervical back: Neck supple  Skin:     General: Skin is warm and dry  Neurological:      Mental Status: She is alert            MD Sami Swartz

## 2022-10-04 LAB
C TRACH DNA SPEC QL NAA+PROBE: NEGATIVE
N GONORRHOEA DNA SPEC QL NAA+PROBE: NEGATIVE
RPR SER QL: NORMAL

## 2022-10-18 ENCOUNTER — OFFICE VISIT (OUTPATIENT)
Dept: OBGYN CLINIC | Facility: CLINIC | Age: 27
End: 2022-10-18

## 2022-10-18 VITALS
DIASTOLIC BLOOD PRESSURE: 78 MMHG | BODY MASS INDEX: 28.49 KG/M2 | SYSTOLIC BLOOD PRESSURE: 125 MMHG | HEIGHT: 64 IN | HEART RATE: 87 BPM

## 2022-10-18 DIAGNOSIS — N93.9 ABNORMAL UTERINE BLEEDING (AUB): ICD-10-CM

## 2022-10-18 DIAGNOSIS — Z71.2 ENCOUNTER TO DISCUSS TEST RESULTS: Primary | ICD-10-CM

## 2022-10-18 DIAGNOSIS — D25.2 INTRAMURAL AND SUBSEROUS LEIOMYOMA OF UTERUS: ICD-10-CM

## 2022-10-18 DIAGNOSIS — D25.1 INTRAMURAL AND SUBSEROUS LEIOMYOMA OF UTERUS: ICD-10-CM

## 2022-10-18 DIAGNOSIS — N84.0 ENDOMETRIAL POLYP: ICD-10-CM

## 2022-10-18 DIAGNOSIS — N94.6 DYSMENORRHEA: ICD-10-CM

## 2022-10-18 DIAGNOSIS — N94.89 ADNEXAL MASS: ICD-10-CM

## 2022-10-18 PROCEDURE — 99213 OFFICE O/P EST LOW 20 MIN: CPT | Performed by: OBSTETRICS & GYNECOLOGY

## 2022-10-18 RX ORDER — NAPROXEN SODIUM 550 MG/1
550 TABLET ORAL 2 TIMES DAILY WITH MEALS
Qty: 30 TABLET | Refills: 3 | Status: SHIPPED | OUTPATIENT
Start: 2022-10-18

## 2022-10-18 NOTE — PATIENT INSTRUCTIONS
[place] por moran confianza en nuestro equipo  Kathy Nicolle y agradecemos zahira comentarios  Si recibe douglas encuesta nuestra, tómese unos momentos para informarnos cómo estamos     Sinceramente,  Zairani Piles Toussaint-Foster

## 2022-10-18 NOTE — PROGRESS NOTES
PROBLEM GYNECOLOGICAL VISIT    Angelia Jennings is a 28 y o  female who presents today for follow up s/p pelvic sonogram   Her general medical history has been reviewed and she reports it as follows:    No past medical history on file  No past surgical history on file  OB History        0    Para   0    Term   0       0    AB   0    Living   0       SAB   0    IAB   0    Ectopic   0    Multiple   0    Live Births   0               Social History     Tobacco Use   • Smoking status: Never Smoker   • Smokeless tobacco: Never Used   Vaping Use   • Vaping Use: Never used   Substance Use Topics   • Alcohol use: Never   • Drug use: Never     Social History     Substance and Sexual Activity   Sexual Activity Yes   • Partners: Male       Current Outpatient Medications   Medication Instructions   • medroxyPROGESTERone (PROVERA) 10 mg, Oral, Daily   • naproxen sodium (ANAPROX) 550 mg, Oral, 2 times daily with meals, Take when you are having severe cramping with your menses   • norethindrone-ethinyl estradiol (Junel ) 1-20 MG-MCG per tablet 1 tablet, Oral, Daily       History of Present Illness:   Patient presents for follow up s/p pelvic sonogram for AUB with c/o bleeding heavy for 14days and severe cramping  Patient also states that she took the OCP and stopped a week prior to restarting the OCP  Review of Systems:  Review of Systems   Genitourinary: Positive for menstrual problem  AUB and menorrhagia   All other systems reviewed and are negative  Physical Exam:  /78   Pulse 87   Ht 5' 4" (1 626 m)   LMP 10/04/2022   BMI 28 49 kg/m²   Physical Exam  Constitutional:       Appearance: Normal appearance  Neurological:      Mental Status: She is alert  Vitals reviewed  Discussion:  Discuss with patient sonogram consist of endometrial polyp, fibroid, and an adnexal mass unsure if it is a fibroid  Recommend to get mri for further evaluation   Reviewed with patient that she is to take her OCP's daily even with a menses and start the next pack on that Sunday after menses and will prescribe naprosyn for cramping  Assessment:   1  Symptomatic fibroid uterus   2  Right adnexal mass   3  Endometrial polyp  Plan:   1  Naprosyn for dysmenorrhea escribed   2  Imaging ordered: mri of pelvis   3  Return to office 3wks  Reviewed with patient that [facility], but that they will not necessarily be reviewed by me immediately  Explained that I will review results at my earliest opportunity and contact patient appropriately

## 2022-11-03 ENCOUNTER — HOSPITAL ENCOUNTER (OUTPATIENT)
Dept: MRI IMAGING | Facility: HOSPITAL | Age: 27
Discharge: HOME | End: 2022-11-03
Attending: OBSTETRICS & GYNECOLOGY

## 2022-11-03 ENCOUNTER — APPOINTMENT (OUTPATIENT)
Dept: LAB | Facility: HOSPITAL | Age: 27
End: 2022-11-03

## 2022-11-03 DIAGNOSIS — D25.2 INTRAMURAL AND SUBSEROUS LEIOMYOMA OF UTERUS: ICD-10-CM

## 2022-11-03 DIAGNOSIS — N94.89 ADNEXAL MASS: ICD-10-CM

## 2022-11-03 DIAGNOSIS — D25.1 INTRAMURAL AND SUBSEROUS LEIOMYOMA OF UTERUS: ICD-10-CM

## 2022-11-03 RX ADMIN — GADOBUTROL 7 ML: 604.72 INJECTION INTRAVENOUS at 10:49

## 2022-11-09 ENCOUNTER — OFFICE VISIT (OUTPATIENT)
Dept: OBGYN CLINIC | Facility: CLINIC | Age: 27
End: 2022-11-09

## 2022-11-09 VITALS
WEIGHT: 170 LBS | HEART RATE: 81 BPM | SYSTOLIC BLOOD PRESSURE: 122 MMHG | HEIGHT: 64 IN | DIASTOLIC BLOOD PRESSURE: 82 MMHG | BODY MASS INDEX: 29.02 KG/M2

## 2022-11-09 DIAGNOSIS — Z71.2 ENCOUNTER TO DISCUSS TEST RESULTS: Primary | ICD-10-CM

## 2022-11-09 DIAGNOSIS — D21.9 FIBROID: ICD-10-CM

## 2022-11-09 NOTE — PROGRESS NOTES
PROBLEM GYNECOLOGICAL VISIT    Patito Robles is a 28 y o  female who presents today for results  Her general medical history has been reviewed and she reports it as follows:    No past medical history on file  No past surgical history on file  OB History        0    Para   0    Term   0       0    AB   0    Living   0       SAB   0    IAB   0    Ectopic   0    Multiple   0    Live Births   0               Social History     Tobacco Use   • Smoking status: Never Smoker   • Smokeless tobacco: Never Used   Vaping Use   • Vaping Use: Never used   Substance Use Topics   • Alcohol use: Never   • Drug use: Never     Social History     Substance and Sexual Activity   Sexual Activity Yes   • Partners: Male       Current Outpatient Medications   Medication Instructions   • medroxyPROGESTERone (PROVERA) 10 mg, Oral, Daily   • naproxen sodium (ANAPROX) 550 mg, Oral, 2 times daily with meals, Take when you are having severe cramping with your menses   • norethindrone-ethinyl estradiol (Junel 1/20) 1-20 MG-MCG per tablet 1 tablet, Oral, Daily       History of Present Illness:   Patient presents s/p mri for fibroid uterus with no new complaints  Review of Systems:  Review of Systems   All other systems reviewed and are negative  Physical Exam:  /82   Pulse 81   Ht 5' 4" (1 626 m)   Wt 77 1 kg (170 lb)   LMP 2022   BMI 29 18 kg/m²   Physical Exam  Constitutional:       Appearance: Normal appearance  Neurological:      Mental Status: She is alert  Vitals reviewed  Discussion:  Discuss with patient mri consist of 2 large fibroids which consist of intramural components  Recommend an [place] since patient desires to conceive in the coming year  Discuss with patient will need an EMB prior to having the procedure schedule  Assessment:   1  Fibroid uterus    Plan:   1  [place]   2  Patient will return for EMB   3  Cytotec prep escribe   4  Return to office 1wk       Reviewed with patient that test results are available in [facility] immediately, but that they will not necessarily be reviewed by me immediately  Explained that I will review results at my earliest opportunity and contact patient appropriately

## 2022-11-09 NOTE — PATIENT INSTRUCTIONS
[place] por moran confianza en nuestro equipo  Sandy Neri y agradecemos zahira comentarios  Si recibe douglas encuesta nuestra, tómese unos momentos para informarnos cómo estamos     Sinceramente,  [facility], DO

## 2022-11-10 RX ORDER — MISOPROSTOL 200 UG/1
TABLET ORAL
Qty: 2 TABLET | Refills: 0 | Status: SHIPPED | OUTPATIENT
Start: 2022-11-10

## 2022-11-16 ENCOUNTER — PROCEDURE VISIT (OUTPATIENT)
Dept: OBGYN CLINIC | Facility: CLINIC | Age: 27
End: 2022-11-16

## 2022-11-16 VITALS
WEIGHT: 169 LBS | HEART RATE: 87 BPM | DIASTOLIC BLOOD PRESSURE: 82 MMHG | SYSTOLIC BLOOD PRESSURE: 120 MMHG | BODY MASS INDEX: 28.85 KG/M2 | HEIGHT: 64 IN

## 2022-11-16 DIAGNOSIS — D21.9 FIBROID: Primary | ICD-10-CM

## 2022-11-16 LAB — SL AMB POCT URINE HCG: NORMAL

## 2022-11-16 RX ORDER — LEVONORGESTREL AND ETHINYL ESTRADIOL 0.1-0.02MG
1 KIT ORAL DAILY
COMMUNITY
Start: 2022-11-10

## 2022-11-16 RX ORDER — IBUPROFEN 600 MG/1
600 TABLET ORAL ONCE
Status: COMPLETED | OUTPATIENT
Start: 2022-11-16 | End: 2022-11-16

## 2022-11-16 RX ADMIN — IBUPROFEN 600 MG: 600 TABLET ORAL at 10:53

## 2022-11-16 NOTE — PROGRESS NOTES
Endometrial biopsy    Date/Time: 11/16/2022 10:50 AM  Performed by: Eduin Michael DO  Authorized by: Eduin Michael DO   Universal Protocol:  Consent: Verbal consent obtained  Written consent obtained  Risks and benefits: risks, benefits and alternatives were discussed  Consent given by: patient  Time out: Immediately prior to procedure a "time out" was called to verify the correct patient, procedure, equipment, support staff and site/side marked as required  Timeout called at: 11/16/2022 9:48 AM   Patient understanding: patient states understanding of the procedure being performed  Patient consent: the patient's understanding of the procedure matches consent given  Procedure consent: procedure consent matches procedure scheduled  Relevant documents: relevant documents present and verified  Test results: test results available and properly labeled  Radiology Images displayed and confirmed  If images not available, report reviewed: imaging studies available  Patient identity confirmed: verbally with patient and provided demographic data      Indication:     Indications:  Other disorder of menstruation and other abnormal bleeding from female genital tract      Indications comment:  Fibroid uterus  Procedure:     Procedure: endometrial biopsy with Pipelle      A bivalve speculum was placed in the vagina: yes      Cervix cleaned and prepped: yes      The cervix was dilated: yes      Uterus sounded: yes      Uterus sound depth (cm):  6 5    Specimen collected: specimen collected and sent to pathology      Patient tolerated procedure well with no complications: no

## 2022-11-16 NOTE — PATIENT INSTRUCTIONS
[place] por moran confianza en nuestro equipo  Ashwin Ege y agradecemos zahira comentarios  Si recibe douglas encuesta nuestra, tómese unos momentos para informarnos cómo estamos     Sinceramente,  [facility], DO

## 2022-12-05 ENCOUNTER — CLINICAL SUPPORT (OUTPATIENT)
Dept: FAMILY MEDICINE CLINIC | Facility: CLINIC | Age: 27
End: 2022-12-05

## 2022-12-05 ENCOUNTER — OFFICE VISIT (OUTPATIENT)
Dept: OBGYN CLINIC | Facility: CLINIC | Age: 27
End: 2022-12-05

## 2022-12-05 VITALS
SYSTOLIC BLOOD PRESSURE: 124 MMHG | HEART RATE: 96 BPM | WEIGHT: 170 LBS | HEIGHT: 64 IN | DIASTOLIC BLOOD PRESSURE: 80 MMHG | BODY MASS INDEX: 29.02 KG/M2

## 2022-12-05 DIAGNOSIS — D21.9 FIBROID: ICD-10-CM

## 2022-12-05 DIAGNOSIS — N93.9 ABNORMAL UTERINE BLEEDING (AUB): ICD-10-CM

## 2022-12-05 DIAGNOSIS — Z23 NEED FOR VACCINATION: Primary | ICD-10-CM

## 2022-12-05 DIAGNOSIS — Z71.2 ENCOUNTER TO DISCUSS TEST RESULTS: Primary | ICD-10-CM

## 2022-12-05 NOTE — PROGRESS NOTES
PROBLEM GYNECOLOGICAL VISIT    Roula Smalls is a 28 y o  female who presents today for results  Her general medical history has been reviewed and she reports it as follows:    No past medical history on file  No past surgical history on file  OB History        0    Para   0    Term   0       0    AB   0    Living   0       SAB   0    IAB   0    Ectopic   0    Multiple   0    Live Births   0               Social History     Tobacco Use   • Smoking status: Never   • Smokeless tobacco: Never   Vaping Use   • Vaping Use: Never used   Substance Use Topics   • Alcohol use: Never   • Drug use: Never     Social History     Substance and Sexual Activity   Sexual Activity Yes   • Partners: Male       Current Outpatient Medications   Medication Instructions   • medroxyPROGESTERone (PROVERA) 10 mg, Oral, Daily   • miSOPROStol (Cytotec) 200 mcg tablet Erasmo douglas tableta por via oral la noche anterior al prcedimiento y douglas tableta en la vagina la manana del procedimiento a las 5:30am   • naproxen sodium (ANAPROX) 550 mg, Oral, 2 times daily with meals, Take when you are having severe cramping with your menses   • norethindrone-ethinyl estradiol (Junel ) 1-20 MG-MCG per tablet 1 tablet, Oral, Daily   • Sronyx 0 1-20 MG-MCG per tablet 1 tablet, Daily       History of Present Illness:   Patient presents for results s/p pelvic mri with c/o chest pain and shooting pain down her left arm  Review of Systems:  Review of Systems   Constitutional:        Shooting pain down the left arm   Respiratory: Positive for chest tightness  Chest pain   All other systems reviewed and are negative  Physical Exam:  /80   Pulse 96   Ht 5' 4" (1 626 m)   Wt 77 1 kg (170 lb)   LMP 2022   BMI 29 18 kg/m²   Physical Exam  Constitutional:       Appearance: Normal appearance  Neurological:      Mental Status: She is alert  Vitals reviewed       Discussion:  Discuss with patient mri consist of 11wk size uterus with a 5 6 cm  Fibroid with necrosis and a 6 8cm fibroid with normal endometrial stripe  Discuss with patient will have the office pre-auth for [place] for the fibroids  Assessment:   1  Symptomatic fibroid uterus   2  AUB    Plan:   1  Recommend [place]   2  The office will call with pre-auth results and appt  Reviewed with patient that test results are available in MyChart immediately, but that they will not necessarily be reviewed by me immediately  Explained that I will review results at my earliest opportunity and contact patient appropriately

## 2022-12-05 NOTE — PATIENT INSTRUCTIONS
[place] por moran confianza en nuestro equipo  Sharjameeln Radha y agradecemos zahira comentarios  Si recibe douglas encuesta nuestra, tómese unos momentos para informarnos cómo estamos     Sinceramente,  [facility], DO

## 2022-12-28 ENCOUNTER — TELEPHONE (OUTPATIENT)
Dept: OBGYN CLINIC | Facility: CLINIC | Age: 27
End: 2022-12-28

## 2022-12-29 DIAGNOSIS — D25.2 INTRAMURAL AND SUBSEROUS LEIOMYOMA OF UTERUS: Primary | ICD-10-CM

## 2022-12-29 DIAGNOSIS — D25.1 INTRAMURAL AND SUBSEROUS LEIOMYOMA OF UTERUS: Primary | ICD-10-CM

## 2023-01-16 ENCOUNTER — CLINICAL SUPPORT (OUTPATIENT)
Dept: FAMILY MEDICINE CLINIC | Facility: CLINIC | Age: 28
End: 2023-01-16

## 2023-01-16 DIAGNOSIS — Z23 NEED FOR VACCINATION: Primary | ICD-10-CM

## 2023-01-19 ENCOUNTER — OFFICE VISIT (OUTPATIENT)
Dept: FAMILY MEDICINE CLINIC | Facility: CLINIC | Age: 28
End: 2023-01-19

## 2023-01-19 VITALS
DIASTOLIC BLOOD PRESSURE: 88 MMHG | TEMPERATURE: 98.6 F | BODY MASS INDEX: 28.68 KG/M2 | OXYGEN SATURATION: 99 % | RESPIRATION RATE: 18 BRPM | WEIGHT: 168 LBS | HEIGHT: 64 IN | SYSTOLIC BLOOD PRESSURE: 112 MMHG | HEART RATE: 79 BPM

## 2023-01-19 DIAGNOSIS — R35.0 URINARY FREQUENCY: ICD-10-CM

## 2023-01-19 DIAGNOSIS — R11.2 NAUSEA AND VOMITING, UNSPECIFIED VOMITING TYPE: Primary | ICD-10-CM

## 2023-01-19 LAB
SL AMB  POCT GLUCOSE, UA: NORMAL
SL AMB LEUKOCYTE ESTERASE,UA: NORMAL
SL AMB POCT BILIRUBIN,UA: NORMAL
SL AMB POCT BLOOD,UA: NORMAL
SL AMB POCT CLARITY,UA: CLEAR
SL AMB POCT COLOR,UA: YELLOW
SL AMB POCT KETONES,UA: NORMAL
SL AMB POCT NITRITE,UA: NORMAL
SL AMB POCT PH,UA: 5
SL AMB POCT SPECIFIC GRAVITY,UA: 1.02
SL AMB POCT URINE PROTEIN: NORMAL
SL AMB POCT UROBILINOGEN: NORMAL

## 2023-01-19 RX ORDER — ONDANSETRON 2 MG/ML
4 INJECTION INTRAMUSCULAR; INTRAVENOUS ONCE
Status: COMPLETED | OUTPATIENT
Start: 2023-01-19 | End: 2023-01-19

## 2023-01-19 RX ORDER — ONDANSETRON 4 MG/1
4 TABLET, FILM COATED ORAL EVERY 8 HOURS PRN
Qty: 90 TABLET | Refills: 0 | Status: SHIPPED | OUTPATIENT
Start: 2023-01-19

## 2023-01-19 RX ADMIN — ONDANSETRON 4 MG: 2 INJECTION INTRAMUSCULAR; INTRAVENOUS at 12:17

## 2023-01-19 NOTE — PROGRESS NOTES
Name: Elias Bennett      : 1995      MRN: [id number]  Encounter Provider: Tonie Belle MD  Encounter Date: 2023   Encounter department: [facility]     1  Nausea and vomiting, unspecified vomiting type  -     ondansetron (ZOFRAN) injection 4 mg  -     ondansetron (ZOFRAN) 4 mg tablet; Take 1 tablet (4 mg total) by mouth every 8 (eight) hours as needed for nausea or vomiting    2  Urinary frequency  -     POCT urine dip       Reviewed MRI results with patient   Subjective      29 yo female with pelvic pain, followed by GYN, had MRI pelvis done 2022 which reported Enlarged fibroid uterus including an anterior uterine body submucosal fibroid expanding the myometrium and distorting the endometrial stripe  Question right adnexal mass corresponds to a subserosal fibroid  Patient here would like results explained better  Currently complains of severe nausea  Review of Systems   Gastrointestinal: Positive for nausea  All other systems reviewed and are negative        Current Outpatient Medications on File Prior to Visit   Medication Sig   • medroxyPROGESTERone (PROVERA) 10 mg tablet Take 1 tablet (10 mg total) by mouth daily (Patient not taking: Reported on 10/18/2022)   • miSOPROStol (Cytotec) 200 mcg tablet Erasmo douglas tableta por via oral la noche anterior al prcedimiento y [place] tableta en la vagina la manana del procedimiento a las 5:30am (Patient not taking: Reported on 2022)   • naproxen sodium (ANAPROX) 550 mg tablet Take 1 tablet (550 mg total) by mouth 2 (two) times a day with meals Take when you are having severe cramping with your menses   • norethindrone-ethinyl estradiol (Junel ) 1-20 MG-MCG per tablet Take 1 tablet by mouth daily   • Sronyx 0 1-20 MG-MCG per tablet Take 1 tablet by mouth daily (Patient not taking: Reported on 2022)       Objective     /88 (BP Location: Right arm, Patient Position: Sitting, Cuff Size: Standard)   Pulse 79   Temp 98 6 °F (37 °C) (Temporal)   Resp 18   Ht 5' 4" (1 626 m)   Wt 76 2 kg (168 lb)   SpO2 99%   BMI 28 84 kg/m²     Physical Exam  Vitals and nursing note reviewed  Constitutional:       Appearance: She is well-developed  HENT:      Head: Normocephalic  Right Ear: External ear normal       Left Ear: External ear normal       Nose: Nose normal    Eyes:      Conjunctiva/sclera: Conjunctivae normal       Pupils: Pupils are equal, round, and reactive to light  Neck:      Thyroid: No thyromegaly  Cardiovascular:      Rate and Rhythm: Normal rate and regular rhythm  Heart sounds: Normal heart sounds  Pulmonary:      Effort: Pulmonary effort is normal       Breath sounds: Normal breath sounds  Abdominal:      Palpations: Abdomen is soft  Tenderness: There is no abdominal tenderness  There is no guarding or rebound  Musculoskeletal:         General: Normal range of motion  Cervical back: Normal range of motion and neck supple  Skin:     General: Skin is dry  Neurological:      Mental Status: She is alert and oriented to person, place, and time  Deep Tendon Reflexes: Reflexes are normal and symmetric         Meggan Rizvi MD

## 2023-01-25 ENCOUNTER — TELEMEDICINE (OUTPATIENT)
Dept: INTERVENTIONAL RADIOLOGY/VASCULAR | Facility: CLINIC | Age: 28
End: 2023-01-25

## 2023-01-25 DIAGNOSIS — D25.1 INTRAMURAL AND SUBSEROUS LEIOMYOMA OF UTERUS: ICD-10-CM

## 2023-01-25 DIAGNOSIS — D25.2 INTRAMURAL AND SUBSEROUS LEIOMYOMA OF UTERUS: ICD-10-CM

## 2023-01-25 NOTE — LETTER
January 28, 2023     Macario Fowler, [address] [address]  [address] Marcelino Salazar    Patient: Waunita Sicard   YOB: 1995   Date of Visit: 1/25/2023       Dear Dr Priya Odell: Thank you for referring Manuela Ambrocio to me for evaluation  Below are my notes for this consultation  If you have questions, please do not hesitate to call me  I look forward to following your patient along with you  Sincerely,        Nicole Younger MD        CC: Colletta Maple, MD Adelaide Reichert, MD  1/28/2023 11:39 PM  Sign when Signing Visit  Virtual Regular Visit    Verification of patient location:    Patient is located in the following state in which I hold an active license [place]    We were consulted by Dr Macario Fowler of gynecology service  Assessment/Plan: This is a very pleasant 79-year-old with abnormal uterine bleeding and pelvic pain  After work-up by the gynecology service this has been determined to be caused by her fibroids  We met today over video with her significant other on the call  First we began with discussing symptoms, she describes back pain, pelvic pain, some nausea  She also describes abnormal uterine bleeding and has been bleeding nearly every day since September 2022  Prior to this she was relatively normal in her opinion with normal menstrual cycles  She has never been pregnant  We reviewed the etiology of fibroids, namely that they are benign tumors and that hers have been characterized appropriately with ultrasound and MRI  We reviewed the MRI imaging together and discussed how there can be abnormal bleeding due to contact with the uterine lining and bulk effect on adjacent organs  In her case the fibroids are compressing the bladder  We discussed her goals  She has no children and wishes to maintain fertility  She would like to have less bleeding, less pain and a more sexually active life      She is On birth control pills - started 2021  We discussed the options for management of fibroids  Particularly in the setting of preserving fertility the first choice would be myomectomy if surgically appropriate  However in her case this causes several issues  First the large submucosal fibroid would require extensive resection and the repair may leave her with a weak uterus  This would require  section and may even need to hysterectomy at the time of myomectomy  Therefore on discussion with the gynecology service this is not the preferred first option  Hysterectomy would be curative but is not recommended by any service at this time for this young woman who wishes to remain fertile  I certainly believe that her fibroids are contributing to her dysfunctional bleeding and bulk symptoms  I discussed how they may not be related to nausea or pain elsewhere in the body and treatment of the fibroids would not help those symptoms  We discussed uterine artery embolization and how fibroids are vascular tumors  We discussed how this is a well-established technique with a good safety profile and is most effective for reducing bleeding  I discussed that data regarding fertility is still emerging but there have been pregnancies documented after uterine artery embolization  We discussed how if there is uterine necrosis as a complication this would limit fertility  We also discussed how embolization does not remove the fibroid it simply reduces the blood supply and decreases size  If there it remains bulky fibroid this can negatively impact fertility and she may still require surgery  We also discussed the fact that her submucosal fibroid could slough off  In this case it may get stuck in the vaginal canal particularly given that she has not had children and this may require surgical or hysteroscopic intervention      We discussed the technique for the procedure, that she would probably go home the same day but may require overnight admission  We discussed possibility of early menopause or ovarian damage as well as bleeding and infection  Patient lives near [facility] so we would prefer this campus  Notably 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 and will need to take time off after the procedure  We will follow up with [phone] (patient's cell)       Procedure Scheduling Information:    Hospital(s) Preferred:  [facility] or ([facility]    Physician:  Ortega Gustafson    Procedure: uterina artery embolization    Level of urgency:   LEVEL 3: Elective procedure which can be delayed up to 4 weeks without reasonable expectation of detriment to the patient    Case Length:  3 hours, should be 1st case    Anesthesia:  Conscious Sedation    Bed:  ASC/APU with post-op inpatient bed needed   Possible admit    Equipment Needs/Rep:    Beads    Contrast Allergy:  no            Problem List Items Addressed This Visit    None  Visit Diagnoses     Intramural and subserous leiomyoma of uterus                   Reason for visit is abnormal uterine bleeding     Encounter provider Jyotsna Roesn MD    Provider located at [address] [id number] [facility] [address] [phone]      Recent Visits  Date Type Provider Dept   01/25/23 Telemedicine Jyotsna Rosen MD [facility] Mcneil [id number] recent visits within past 7 days and meeting all other requirements  Future Appointments  No visits were found meeting these conditions  Showing future appointments within next 150 days and meeting all other requirements       The patient was identified by name and date of birth  Gerson Meadows was informed that this is a telemedicine visit and that the [facility]   Elvia Suresh My office door was closed  No one else was in the room  She acknowledged consent and understanding of privacy and security of the video platform   The patient has agreed to participate and understands they can discontinue the visit at any time  Patient is aware this is a billable service  Subjective  Ioana Askew is a 28 y o  female with pelvic pain and abnormal uterine bleeding  HPI     No past medical history on file  NKDA  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)  No anesthesia issues  No history of DVT    Never pregnant  Has plans - about a year  Has not tried    No past surgical history on file  Current Outpatient Medications   Medication Sig Dispense Refill   • medroxyPROGESTERone (PROVERA) 10 mg tablet Take 1 tablet (10 mg total) by mouth daily (Patient not taking: Reported on 10/18/2022) 5 tablet 0   • miSOPROStol (Cytotec) 200 mcg tablet Erasmo douglas tableta por via oral la noche anterior al prcedimiento y [place] tableta en la vagina la manana del procedimiento a las 5:30am (Patient not taking: Reported on 12/5/2022) 2 tablet 0   • naproxen sodium (ANAPROX) 550 mg tablet Take 1 tablet (550 mg total) by mouth 2 (two) times a day with meals Take when you are having severe cramping with your menses 30 tablet 3   • norethindrone-ethinyl estradiol (Junel 1/20) 1-20 MG-MCG per tablet Take 1 tablet by mouth daily 28 tablet 11   • ondansetron (ZOFRAN) 4 mg tablet Take 1 tablet (4 mg total) by mouth every 8 (eight) hours as needed for nausea or vomiting 90 tablet 0   • Sronyx 0 1-20 MG-MCG per tablet Take 1 tablet by mouth daily (Patient not taking: Reported on 12/5/2022)       No current facility-administered medications for this visit  No Known Allergies    Review of Systems as above    Video Exam    There were no vitals filed for this visit      Physical Exam     I spent 43 minutes directly with the patient during this visit

## 2023-01-25 NOTE — PROGRESS NOTES
Virtual Regular Visit    Verification of patient location:    Patient is located in the following state in which I hold an active license [place]    We were consulted by Dr Delmis Ko of gynecology service  Assessment/Plan: This is a very pleasant 60-year-old with abnormal uterine bleeding and pelvic pain  After work-up by the gynecology service this has been determined to be caused by her fibroids  We met today over video with her significant other on the call  First we began with discussing symptoms, she describes back pain, pelvic pain, some nausea  She also describes abnormal uterine bleeding and has been bleeding nearly every day since 2022  Prior to this she was relatively normal in her opinion with normal menstrual cycles  She has never been pregnant  We reviewed the etiology of fibroids, namely that they are benign tumors and that hers have been characterized appropriately with ultrasound and MRI  We reviewed the MRI imaging together and discussed how there can be abnormal bleeding due to contact with the uterine lining and bulk effect on adjacent organs  In her case the fibroids are compressing the bladder  We discussed her goals  She has no children and wishes to maintain fertility  She would like to have less bleeding, less pain and a more sexually active life  She is On birth control pills - started 2021  We discussed the options for management of fibroids  Particularly in the setting of preserving fertility the first choice would be myomectomy if surgically appropriate  However in her case this causes several issues  First the large submucosal fibroid would require extensive resection and the repair may leave her with a weak uterus  This would require  section and may even need to hysterectomy at the time of myomectomy  Therefore on discussion with the gynecology service this is not the preferred first option      Hysterectomy would be curative but is not recommended by any service at this time for this young woman who wishes to remain fertile  I certainly believe that her fibroids are contributing to her dysfunctional bleeding and bulk symptoms  I discussed how they may not be related to nausea or pain elsewhere in the body and treatment of the fibroids would not help those symptoms  We discussed uterine artery embolization and how fibroids are vascular tumors  We discussed how this is a well-established technique with a good safety profile and is most effective for reducing bleeding  I discussed that data regarding fertility is still emerging but there have been pregnancies documented after uterine artery embolization  We discussed how if there is uterine necrosis as a complication this would limit fertility  We also discussed how embolization does not remove the fibroid it simply reduces the blood supply and decreases size  If there it remains bulky fibroid this can negatively impact fertility and she may still require surgery  We also discussed the fact that her submucosal fibroid could slough off  In this case it may get stuck in the vaginal canal particularly given that she has not had children and this may require surgical or hysteroscopic intervention  We discussed the technique for the procedure, that she would probably go home the same day but may require overnight admission  We discussed possibility of early menopause or ovarian damage as well as bleeding and infection  Patient lives near [facility] so we would prefer this [place]  Notably 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 and will need to take time off after the procedure        We will follow up with [phone] (patient's cell)       Procedure Scheduling Information:    Hospital([facility]) Preferred:  [facility] or ([facility])    Physician:  Nick Anthony    Procedure: uterina artery embolization    Level of urgency:   LEVEL 3: Elective procedure which can be delayed up to 4 weeks without reasonable expectation of detriment to the patient    Case Length:  3 hours, should be 1st case    Anesthesia:  Conscious Sedation    Bed:  ASC/APU with post-op inpatient bed needed   Possible admit    Equipment Needs/Rep:    Beads    Contrast Allergy:  no            Problem List Items Addressed This Visit    None  Visit Diagnoses     Intramural and subserous leiomyoma of uterus                   Reason for visit is abnormal uterine bleeding     Encounter provider Alfonzo Crowe MD    Provider located at [address]  [place] [address] [address]  [phone]      Recent Visits  Date Type Provider Dept   01/25/23 Ann Braun MD P[facility] Judith Jacobso [id number] recent visits within past 7 days and meeting all other requirements  Future Appointments  No visits were found meeting these conditions  Showing future appointments within next 150 days and meeting all other requirements       The patient was identified by name and date of birth  Cheri Hameed was informed that this is a telemedicine visit and that the visit [facility] being [facility] My office door was closed  No one else was in the room  She acknowledged consent and understanding of privacy and security of the video platform  The patient has agreed to participate and understands they can discontinue the visit at any time  Patient is aware this is a billable service  Subjective  Cheri Hameed is a 28 y o  female with pelvic pain and abnormal uterine bleeding  HPI     No past medical history on file  NKDA  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)  No anesthesia issues  No history of DVT    Never pregnant  Has plans - about a year  Has not tried    No past surgical history on file      Current Outpatient Medications   Medication Sig Dispense Refill   • medroxyPROGESTERone (PROVERA) 10 mg tablet Take 1 tablet (10 mg total) by mouth daily (Patient not taking: Reported on 10/18/2022) 5 tablet 0   • miSOPROStol (Cytotec) 200 mcg tablet Erasmo douglas tableta por via oral la noche anterior al prcedimiento y [place] [employer] tableta en la vagina la manana del procedimiento a las 5:30am (Patient not taking: Reported on 12/5/2022) 2 tablet 0   • naproxen sodium (ANAPROX) 550 mg tablet Take 1 tablet (550 mg total) by mouth 2 (two) times a day with meals Take when you are having severe cramping with your menses 30 tablet 3   • norethindrone-ethinyl estradiol (Junel 1/20) 1-20 MG-MCG per tablet Take 1 tablet by mouth daily 28 tablet 11   • ondansetron (ZOFRAN) 4 mg tablet Take 1 tablet (4 mg total) by mouth every 8 (eight) hours as needed for nausea or vomiting 90 tablet 0   • Sronyx 0 1-20 MG-MCG per tablet Take 1 tablet by mouth daily (Patient not taking: Reported on 12/5/2022)       No current facility-administered medications for this visit  No Known Allergies    Review of Systems as above    Video Exam    There were no vitals filed for this visit      Physical Exam     I spent 43 minutes directly with the patient during this visit

## 2023-01-28 ENCOUNTER — PREP FOR PROCEDURE (OUTPATIENT)
Dept: INTERVENTIONAL RADIOLOGY/VASCULAR | Facility: CLINIC | Age: 28
End: 2023-01-28

## 2023-01-28 DIAGNOSIS — N94.89: Primary | ICD-10-CM

## 2023-01-28 DIAGNOSIS — R10.2 PELVIC PAIN: ICD-10-CM

## 2023-01-28 DIAGNOSIS — N92.1 MENORRHAGIA WITH IRREGULAR CYCLE: ICD-10-CM

## 2023-01-28 DIAGNOSIS — D21.9 FIBROIDS: ICD-10-CM

## 2023-01-28 RX ORDER — ONDANSETRON 2 MG/ML
4 INJECTION INTRAMUSCULAR; INTRAVENOUS ONCE
OUTPATIENT
Start: 2023-01-28 | End: 2023-01-28

## 2023-01-28 RX ORDER — ACETAMINOPHEN 325 MG/1
975 TABLET ORAL ONCE
OUTPATIENT
Start: 2023-01-28 | End: 2023-01-28

## 2023-01-28 RX ORDER — DEXAMETHASONE SODIUM PHOSPHATE 10 MG/ML
10 INJECTION, SOLUTION INTRAMUSCULAR; INTRAVENOUS ONCE
OUTPATIENT
Start: 2023-01-28 | End: 2023-01-28

## 2023-01-28 RX ORDER — SODIUM CHLORIDE 9 MG/ML
75 INJECTION, SOLUTION INTRAVENOUS CONTINUOUS
OUTPATIENT
Start: 2023-01-28

## 2023-02-27 ENCOUNTER — TELEPHONE (OUTPATIENT)
Dept: RADIOLOGY | Facility: HOSPITAL | Age: 28
End: 2023-02-27

## 2023-02-27 NOTE — PRE-PROCEDURE INSTRUCTIONS
Pre-procedure Instructions for Interventional Radiology  Nataliia Santana [address] [phone]    You are scheduled for a/an Uterine Artery Embolization  On Monday 3/6/23  Your tentative arrival time is 0830  Short stay will notify you the day before your procedure with the exact arrival time and the location to arrive  To prepare for your procedure:  1  Please arrange for someone to drive you home after the procedure and stay with you until the next morning if you are instructed to do so  This is typically for patients receiving some type of sedative or anesthetic for the procedure  2  DO NOT EAT OR DRINK ANYTHING after midnight on the evening before your procedure including candy & gum   3  ONLY SIPS OF WATER with your medications are allowed on the morning of your procedure  4  TAKE ALL OF YOUR REGULAR MEDICATIONS THE MORNING OF YOUR PROCEDURE with sips of water! We may call you to stop some of your blood sugar, blood pressure and blood thinning medications depending on the procedure  Please take all of these medications unless we instruct you to stop them  5  If you have an allergy to x-ray dye, please contact Interventional Radiology for an x-ray dye preparation which usually consists of an oral steroid and Benadryl  The day of your procedure:  1  Bring a list of the medications you take at home  2  Bring medications you take for breathing problems (such as inhalers), medications for chest pain, or both  3  Bring a case for your glasses or contacts  4  Bring your insurance card and a form of photo ID   5  Please leave all valuables such as credit cards and jewelry at home  6  Report to the registration desk in the main lobby at the [facility], [facility]  Ask to be directed to [facility]    7  While your procedure is being performed, your family may wait in the Radiology Waiting Room on the 1st floor in Radiology  if they need to leave, they may provide a number to be called following the procedure  8  Be prepared to stay overnight just in case  Sometimes procedures will indicate the need for further observation or treatment  9  If you are scheduled for a follow-up visit with the Interventional Radiologist after your procedure, you will be called with a date and time  Special Instructions (Medications to stop taking before your procedure etc ):  Above reviewed with the help of [facility]

## 2023-03-06 ENCOUNTER — HOSPITAL ENCOUNTER (INPATIENT)
Dept: RADIOLOGY | Facility: HOSPITAL | Age: 28
LOS: 1 days | Discharge: HOME | End: 2023-03-09
Attending: RADIOLOGY | Admitting: FAMILY MEDICINE

## 2023-03-06 DIAGNOSIS — N92.1 MENORRHAGIA WITH IRREGULAR CYCLE: ICD-10-CM

## 2023-03-06 DIAGNOSIS — N93.8 DYSFUNCTIONAL UTERINE BLEEDING: Primary | ICD-10-CM

## 2023-03-06 DIAGNOSIS — R21 RASH: ICD-10-CM

## 2023-03-06 DIAGNOSIS — D21.9 FIBROIDS: ICD-10-CM

## 2023-03-06 LAB
ANION GAP SERPL CALCULATED.3IONS-SCNC: 2 MMOL/L (ref 4–13)
ANION GAP SERPL CALCULATED.3IONS-SCNC: 5 MMOL/L (ref 4–13)
BASOPHILS # BLD AUTO: 0.01 THOUSANDS/ÂΜL (ref 0–0.1)
BASOPHILS NFR BLD AUTO: 0 % (ref 0–1)
BUN SERPL-MCNC: 10 MG/DL (ref 5–25)
BUN SERPL-MCNC: 11 MG/DL (ref 5–25)
CALCIUM SERPL-MCNC: 8.8 MG/DL (ref 8.3–10.1)
CALCIUM SERPL-MCNC: 9 MG/DL (ref 8.3–10.1)
CHLORIDE SERPL-SCNC: 110 MMOL/L (ref 96–108)
CHLORIDE SERPL-SCNC: 110 MMOL/L (ref 96–108)
CO2 SERPL-SCNC: 21 MMOL/L (ref 21–32)
CO2 SERPL-SCNC: 25 MMOL/L (ref 21–32)
CREAT SERPL-MCNC: 0.77 MG/DL (ref 0.6–1.3)
CREAT SERPL-MCNC: 0.81 MG/DL (ref 0.6–1.3)
EOSINOPHIL # BLD AUTO: 0 THOUSAND/ÂΜL (ref 0–0.61)
EOSINOPHIL NFR BLD AUTO: 0 % (ref 0–6)
ERYTHROCYTE [DISTWIDTH] IN BLOOD BY AUTOMATED COUNT: 11.9 % (ref 11.6–15.1)
ERYTHROCYTE [DISTWIDTH] IN BLOOD BY AUTOMATED COUNT: 12.1 % (ref 11.6–15.1)
GFR SERPL CREATININE-BSD FRML MDRD: 105 ML/MIN/1.73SQ M
GFR SERPL CREATININE-BSD FRML MDRD: 99 ML/MIN/1.73SQ M
GLUCOSE P FAST SERPL-MCNC: 100 MG/DL (ref 65–99)
GLUCOSE SERPL-MCNC: 100 MG/DL (ref 65–140)
GLUCOSE SERPL-MCNC: 106 MG/DL (ref 65–140)
HCG SERPL QL: NEGATIVE
HCT VFR BLD AUTO: 42.4 % (ref 34.8–46.1)
HCT VFR BLD AUTO: 43.1 % (ref 34.8–46.1)
HGB BLD-MCNC: 13.8 G/DL (ref 11.5–15.4)
HGB BLD-MCNC: 14.1 G/DL (ref 11.5–15.4)
IMM GRANULOCYTES # BLD AUTO: 0.04 THOUSAND/UL (ref 0–0.2)
IMM GRANULOCYTES NFR BLD AUTO: 0 % (ref 0–2)
INR PPP: 0.99 (ref 0.84–1.19)
LACTATE SERPL-SCNC: 1.7 MMOL/L (ref 0.5–2)
LYMPHOCYTES # BLD AUTO: 0.99 THOUSANDS/ÂΜL (ref 0.6–4.47)
LYMPHOCYTES NFR BLD AUTO: 10 % (ref 14–44)
MAGNESIUM SERPL-MCNC: 2.1 MG/DL (ref 1.6–2.6)
MCH RBC QN AUTO: 31.3 PG (ref 26.8–34.3)
MCH RBC QN AUTO: 31.8 PG (ref 26.8–34.3)
MCHC RBC AUTO-ENTMCNC: 32 G/DL (ref 31.4–37.4)
MCHC RBC AUTO-ENTMCNC: 33.3 G/DL (ref 31.4–37.4)
MCV RBC AUTO: 96 FL (ref 82–98)
MCV RBC AUTO: 98 FL (ref 82–98)
MONOCYTES # BLD AUTO: 0.05 THOUSAND/ÂΜL (ref 0.17–1.22)
MONOCYTES NFR BLD AUTO: 1 % (ref 4–12)
NEUTROPHILS # BLD AUTO: 9.13 THOUSANDS/ÂΜL (ref 1.85–7.62)
NEUTS SEG NFR BLD AUTO: 89 % (ref 43–75)
NRBC BLD AUTO-RTO: 0 /100 WBCS
PLATELET # BLD AUTO: 338 THOUSANDS/UL (ref 149–390)
PLATELET # BLD AUTO: 355 THOUSANDS/UL (ref 149–390)
PMV BLD AUTO: 9.8 FL (ref 8.9–12.7)
PMV BLD AUTO: 9.8 FL (ref 8.9–12.7)
POTASSIUM SERPL-SCNC: 3.7 MMOL/L (ref 3.5–5.3)
POTASSIUM SERPL-SCNC: 3.8 MMOL/L (ref 3.5–5.3)
PROTHROMBIN TIME: 13.3 SECONDS (ref 11.6–14.5)
RBC # BLD AUTO: 4.41 MILLION/UL (ref 3.81–5.12)
RBC # BLD AUTO: 4.44 MILLION/UL (ref 3.81–5.12)
SODIUM SERPL-SCNC: 136 MMOL/L (ref 135–147)
SODIUM SERPL-SCNC: 137 MMOL/L (ref 135–147)
WBC # BLD AUTO: 10.22 THOUSAND/UL (ref 4.31–10.16)
WBC # BLD AUTO: 6.56 THOUSAND/UL (ref 4.31–10.16)

## 2023-03-06 RX ORDER — ONDANSETRON 2 MG/ML
4 INJECTION INTRAMUSCULAR; INTRAVENOUS ONCE
Status: COMPLETED | OUTPATIENT
Start: 2023-03-06 | End: 2023-03-06

## 2023-03-06 RX ORDER — IBUPROFEN 400 MG/1
400 TABLET ORAL AS NEEDED
COMMUNITY
End: 2023-03-09

## 2023-03-06 RX ORDER — HYDROMORPHONE HYDROCHLORIDE 4 MG/ML
INJECTION, SOLUTION INTRAMUSCULAR; INTRAVENOUS; SUBCUTANEOUS AS NEEDED
Status: COMPLETED | OUTPATIENT
Start: 2023-03-06 | End: 2023-03-06

## 2023-03-06 RX ORDER — FENTANYL CITRATE/PF 50 MCG/ML
50 SYRINGE (ML) INJECTION
Status: CANCELLED | OUTPATIENT
Start: 2023-03-06

## 2023-03-06 RX ORDER — CEFAZOLIN SODIUM 2 G/50ML
2000 SOLUTION INTRAVENOUS ONCE
Status: COMPLETED | OUTPATIENT
Start: 2023-03-06 | End: 2023-03-06

## 2023-03-06 RX ORDER — SODIUM CHLORIDE, SODIUM LACTATE, POTASSIUM CHLORIDE, CALCIUM CHLORIDE 600; 310; 30; 20 MG/100ML; MG/100ML; MG/100ML; MG/100ML
20 INJECTION, SOLUTION INTRAVENOUS CONTINUOUS
Status: CANCELLED | OUTPATIENT
Start: 2023-03-06

## 2023-03-06 RX ORDER — FENTANYL CITRATE 50 UG/ML
INJECTION, SOLUTION INTRAMUSCULAR; INTRAVENOUS AS NEEDED
Status: COMPLETED | OUTPATIENT
Start: 2023-03-06 | End: 2023-03-06

## 2023-03-06 RX ORDER — ONDANSETRON 2 MG/ML
4 INJECTION INTRAMUSCULAR; INTRAVENOUS EVERY 6 HOURS PRN
Status: DISCONTINUED | OUTPATIENT
Start: 2023-03-06 | End: 2023-03-09 | Stop reason: HOSPADM

## 2023-03-06 RX ORDER — ONDANSETRON 2 MG/ML
4 INJECTION INTRAMUSCULAR; INTRAVENOUS ONCE AS NEEDED
Status: CANCELLED | OUTPATIENT
Start: 2023-03-06

## 2023-03-06 RX ORDER — DIPHENHYDRAMINE HYDROCHLORIDE 50 MG/ML
12.5 INJECTION INTRAMUSCULAR; INTRAVENOUS ONCE AS NEEDED
Status: CANCELLED | OUTPATIENT
Start: 2023-03-06

## 2023-03-06 RX ORDER — DIPHENHYDRAMINE HYDROCHLORIDE 50 MG/ML
25 INJECTION INTRAMUSCULAR; INTRAVENOUS ONCE
Status: COMPLETED | OUTPATIENT
Start: 2023-03-06 | End: 2023-03-06

## 2023-03-06 RX ORDER — SODIUM CHLORIDE 9 MG/ML
50 INJECTION, SOLUTION INTRAVENOUS CONTINUOUS
Status: DISCONTINUED | OUTPATIENT
Start: 2023-03-06 | End: 2023-03-07

## 2023-03-06 RX ORDER — IODIXANOL 320 MG/ML
300 INJECTION, SOLUTION INTRAVASCULAR
Status: COMPLETED | OUTPATIENT
Start: 2023-03-06 | End: 2023-03-06

## 2023-03-06 RX ORDER — KETOROLAC TROMETHAMINE 30 MG/ML
15 INJECTION, SOLUTION INTRAMUSCULAR; INTRAVENOUS EVERY 6 HOURS PRN
Status: COMPLETED | OUTPATIENT
Start: 2023-03-06 | End: 2023-03-07

## 2023-03-06 RX ORDER — DEXAMETHASONE SODIUM PHOSPHATE 10 MG/ML
10 INJECTION, SOLUTION INTRAMUSCULAR; INTRAVENOUS ONCE
Status: COMPLETED | OUTPATIENT
Start: 2023-03-06 | End: 2023-03-06

## 2023-03-06 RX ORDER — IBUPROFEN 600 MG/1
600 TABLET ORAL EVERY 8 HOURS SCHEDULED
Status: DISCONTINUED | OUTPATIENT
Start: 2023-03-07 | End: 2023-03-08

## 2023-03-06 RX ORDER — ACETAMINOPHEN 325 MG/1
975 TABLET ORAL ONCE
Status: COMPLETED | OUTPATIENT
Start: 2023-03-06 | End: 2023-03-06

## 2023-03-06 RX ORDER — MIDAZOLAM HYDROCHLORIDE 2 MG/2ML
INJECTION, SOLUTION INTRAMUSCULAR; INTRAVENOUS AS NEEDED
Status: COMPLETED | OUTPATIENT
Start: 2023-03-06 | End: 2023-03-06

## 2023-03-06 RX ORDER — LIDOCAINE HYDROCHLORIDE 10 MG/ML
INJECTION, SOLUTION EPIDURAL; INFILTRATION; INTRACAUDAL; PERINEURAL AS NEEDED
Status: COMPLETED | OUTPATIENT
Start: 2023-03-06 | End: 2023-03-06

## 2023-03-06 RX ADMIN — ONDANSETRON 4 MG: 2 INJECTION INTRAMUSCULAR; INTRAVENOUS at 10:28

## 2023-03-06 RX ADMIN — SODIUM CHLORIDE 75 ML/HR: 0.9 INJECTION, SOLUTION INTRAVENOUS at 09:22

## 2023-03-06 RX ADMIN — KETOROLAC TROMETHAMINE 15 MG: 30 INJECTION, SOLUTION INTRAMUSCULAR; INTRAVENOUS at 20:17

## 2023-03-06 RX ADMIN — MIDAZOLAM 1 MG: 1 INJECTION INTRAMUSCULAR; INTRAVENOUS at 12:29

## 2023-03-06 RX ADMIN — FENTANYL CITRATE 25 MCG: 50 INJECTION, SOLUTION INTRAMUSCULAR; INTRAVENOUS at 11:39

## 2023-03-06 RX ADMIN — ONDANSETRON 4 MG: 2 INJECTION INTRAMUSCULAR; INTRAVENOUS at 20:17

## 2023-03-06 RX ADMIN — KETOROLAC TROMETHAMINE 15 MG: 30 INJECTION, SOLUTION INTRAMUSCULAR; INTRAVENOUS at 15:35

## 2023-03-06 RX ADMIN — Medication 50 MCG: at 12:00

## 2023-03-06 RX ADMIN — ONDANSETRON 4 MG: 2 INJECTION INTRAMUSCULAR; INTRAVENOUS at 21:59

## 2023-03-06 RX ADMIN — MIDAZOLAM 0.5 MG: 1 INJECTION INTRAMUSCULAR; INTRAVENOUS at 11:30

## 2023-03-06 RX ADMIN — FENTANYL CITRATE 50 MCG: 50 INJECTION, SOLUTION INTRAMUSCULAR; INTRAVENOUS at 12:29

## 2023-03-06 RX ADMIN — DEXAMETHASONE SODIUM PHOSPHATE 10 MG: 10 INJECTION, SOLUTION INTRAMUSCULAR; INTRAVENOUS at 10:28

## 2023-03-06 RX ADMIN — Medication: at 13:47

## 2023-03-06 RX ADMIN — Medication 50 MCG: at 13:10

## 2023-03-06 RX ADMIN — IBUPROFEN 600 MG: 600 TABLET ORAL at 23:25

## 2023-03-06 RX ADMIN — MIDAZOLAM 1 MG: 1 INJECTION INTRAMUSCULAR; INTRAVENOUS at 11:00

## 2023-03-06 RX ADMIN — FENTANYL CITRATE 25 MCG: 50 INJECTION, SOLUTION INTRAMUSCULAR; INTRAVENOUS at 11:30

## 2023-03-06 RX ADMIN — IODIXANOL 160 ML: 320 INJECTION, SOLUTION INTRAVASCULAR at 13:45

## 2023-03-06 RX ADMIN — LIDOCAINE HYDROCHLORIDE 10 ML: 10 INJECTION, SOLUTION EPIDURAL; INFILTRATION; INTRACAUDAL; PERINEURAL at 11:39

## 2023-03-06 RX ADMIN — SODIUM CHLORIDE 75 ML/HR: 0.9 INJECTION, SOLUTION INTRAVENOUS at 16:15

## 2023-03-06 RX ADMIN — ONDANSETRON 4 MG: 2 INJECTION INTRAMUSCULAR; INTRAVENOUS at 22:20

## 2023-03-06 RX ADMIN — FENTANYL CITRATE 50 MCG: 50 INJECTION, SOLUTION INTRAMUSCULAR; INTRAVENOUS at 11:00

## 2023-03-06 RX ADMIN — ACETAMINOPHEN 975 MG: 325 TABLET ORAL at 09:38

## 2023-03-06 RX ADMIN — CEFAZOLIN SODIUM 2000 MG: 2 SOLUTION INTRAVENOUS at 10:28

## 2023-03-06 RX ADMIN — DIPHENHYDRAMINE HYDROCHLORIDE 25 MG: 50 INJECTION, SOLUTION INTRAMUSCULAR; INTRAVENOUS at 11:00

## 2023-03-06 RX ADMIN — MIDAZOLAM 1 MG: 1 INJECTION INTRAMUSCULAR; INTRAVENOUS at 13:09

## 2023-03-06 RX ADMIN — HYDROMORPHONE HYDROCHLORIDE 0.5 MG: 4 INJECTION, SOLUTION INTRAMUSCULAR; INTRAVENOUS; SUBCUTANEOUS at 13:27

## 2023-03-06 RX ADMIN — LIDOCAINE HYDROCHLORIDE 10 ML: 10 INJECTION, SOLUTION EPIDURAL; INFILTRATION; INTRACAUDAL; PERINEURAL at 13:17

## 2023-03-06 RX ADMIN — MIDAZOLAM 0.5 MG: 1 INJECTION INTRAMUSCULAR; INTRAVENOUS at 11:39

## 2023-03-06 RX ADMIN — FENTANYL CITRATE 50 MCG: 50 INJECTION, SOLUTION INTRAMUSCULAR; INTRAVENOUS at 13:09

## 2023-03-06 NOTE — H&P
Interventional Radiology  History and Physical 3/6/2023     Cleveland Monroy   1995   [id number]    Assessment/Plan:      29-year-old with dysfunctional uterine bleeding related to fibroids as well as pelvic pain and fullness due to bulk symptoms    Previously seen in virtual clinic    And we have elected to proceed with uterine artery embolization  She has had intermittent bleeding for at least several months possibly up to 4 months without a regular Menstrual cycle    We discussed the technique risks benefits and alternatives for uterine artery embolization    We discussed the incompletely studied effects on fertility but that her gynecology team believes that myomectomy is higher risk in this situation given the anatomy of her fibroids    We discussed that she will not undergo any heavy lifting for a week, she has taken time off work     we discussed that pain and fever after the procedure are normal but that if she still has fever after a week we have to  Be concerned about dislodged fibroids that may require surgical intervention    We discussed that we will follow-up with MRI in several months    We discussed the risk of nontarget embolization as well, and that fibroids may recur given her young age    we used a  today    Consent obtained  mp2 asa3        Problem List Items Addressed This Visit    None  Visit Diagnoses     Menorrhagia with irregular cycle        Relevant Orders    IR uterine artery embolization    Fibroids        Relevant Orders    IR uterine artery embolization             Subjective:     Patient ID: Cleveland Monroy is a 28 y o  female  History of Present Illness  As above    Review of Systems      No past medical history on file  No past surgical history on file       Social History     Tobacco Use   Smoking Status Never   Smokeless Tobacco Never        Social History     Substance and Sexual Activity   Alcohol Use Never        Social History     Substance and Sexual Activity   Drug Use Never        No Known Allergies    Current Outpatient Medications   Medication Sig Dispense Refill   • ibuprofen (MOTRIN) 400 mg tablet Take 400 mg by mouth as needed for mild pain     • norethindrone-ethinyl estradiol (Junel 1/20) 1-20 MG-MCG per tablet Take 1 tablet by mouth daily 28 tablet 11   • ondansetron (ZOFRAN) 4 mg tablet Take 1 tablet (4 mg total) by mouth every 8 (eight) hours as needed for nausea or vomiting 90 tablet 0   • medroxyPROGESTERone (PROVERA) 10 mg tablet Take 1 tablet (10 mg total) by mouth daily 5 tablet 0   • miSOPROStol (Cytotec) 200 mcg tablet Erasmo douglas tableta por via oral la noche anterior al prcedimiento y douglas tableta en la vagina la manana del procedimiento a las 5:30am 2 tablet 0   • naproxen sodium (ANAPROX) 550 mg tablet Take 1 tablet (550 mg total) by mouth 2 (two) times a day with meals Take when you are having severe cramping with your menses 30 tablet 3   • Sronyx 0 1-20 MG-MCG per tablet Take 1 tablet by mouth daily (Patient not taking: Reported on 12/5/2022)       Current Facility-Administered Medications   Medication Dose Route Frequency Provider Last Rate Last Admin   • fentanyl citrate (PF) 100 MCG/2ML   Intravenous PRN Norah Allison MD   50 mcg at 03/06/23 1100   • midazolam (VERSED) injection    PRN Norah Allison MD   1 mg at 03/06/23 1100   • sodium chloride 0 9 % infusion  75 mL/hr Intravenous Continuous Norah Allison MD 75 mL/hr at 03/06/23 0922 75 mL/hr at 03/06/23 0922          Objective:    Vitals:    03/06/23 0915 03/06/23 1058   BP: 123/74 117/72   Pulse: 75 73   Resp: 17 18   Temp: 98 1 °F (36 7 °C)    TempSrc: Temporal    SpO2: 99% 99%   Weight: 76 2 kg (168 lb)    Height: 5' 3" (1 6 m)         Physical Exam      No results found for: BNP   Lab Results   Component Value Date    WBC 6 56 03/06/2023    HGB 13 8 03/06/2023    HCT 43 1 03/06/2023    MCV 98 03/06/2023     03/06/2023     Lab Results   Component Value Date    INR 0 99 03/06/2023 PROTIME 13 3 03/06/2023     No results found for: PTT      I have personally reviewed pertinent imaging and laboratory results  Code Status: No Order  Advance Directive and Living Will:      Power of :    POLST:      This text is generated with voice recognition software  There may be translation, syntax,  or grammatical errors  If you have any questions, please contact the dictating provider

## 2023-03-06 NOTE — QUICK NOTE
Has had ongoing dysfunctional uterine bleeding  Presented for IR uterine embolization patient underwent today  For intractable painCurrently on PCA pump  If remains on PCA pump would recommend pain management consultation  Was asked to see patient due to dizziness and muscle cramping  Suspect secondary to pain and possible electrolyte abnormalities  We will check CBC BMP, lactate, magnesium  Check EKG as well

## 2023-03-06 NOTE — QUICK NOTE
Patient seen and examined  Appears in pain but not in distress, abdomen mildly tender, groin site intact, distal pulses intact, Mota catheter in place  The PCA is providing relief  Brother at bedside translating    She states that currently pain is at 8 out of 10  The PCA was paused due to dizziness earlier although this appears to have occurred when the Toradol was administered    We turned the PCA back on with the nursing team and a dose was administered without ill effects    I do not anticipate that she will require a pain management consultation    Plan:  -Discontinue Toradol  -Initiate ibuprofen  -Continue Zofran  -Advance diet    Anticipate Mota removal in the morning and discharge home tomorrow  Suggest that we transition to oral opioids in the morning      I am at another hospital tomorrow but the IR team will see the patient    Patient should be discharged home with  -Percocet  -Bowel regimen (constipation is very common in this population after procedure)     -Interventional radiology will arrange for follow-up MRI and clinic visit     all questions answered    Also discussed with Dr Christiano Bae, hospitalist services greatly appreciated

## 2023-03-06 NOTE — BRIEF OP NOTE (RAD/CATH)
INTERVENTIONAL RADIOLOGY PROCEDURE NOTE    Date: 3/6/2023    Procedure:   Procedure Summary     Date: 03/06/23 Room / Location: [address] [place] [address] Interventional Radiology    Anesthesia Start:  Anesthesia Stop:     Procedure: IR UTERINE ARTERY EMBOLIZATION Diagnosis:       Menorrhagia with irregular cycle      Fibroids      (UFE)    Scheduled Providers: Rody López MD Responsible Provider:     Anesthesia Type: Not recorded ASA Status: Not recorded          Preoperative diagnosis:   1  Menorrhagia with irregular cycle    2  Fibroids         Postoperative diagnosis: Same  Surgeon: Rody López MD     Assistant: None  No qualified resident was available  Blood loss: 0    Specimens: 0     Findings: uterine artery embolization    R groin access vascade hemostasis    Complications: None immediate      Anesthesia: conscious sedation and local     Ancef given

## 2023-03-06 NOTE — ASSESSMENT & PLAN NOTE
Has had ongoing dysfunctional uterine bleeding  Presented for IR uterine embolization  Now postop and noted to have intractable pain and thus was consulted  Currently on PCA pump  If remains on PCA pump would recommend pain management consultation

## 2023-03-06 NOTE — SEDATION DOCUMENTATION
Uterine artery embolization completed by Dr Tran Mckinney without complications  Right groin access site closed with vascade and covered with dry dressing  Bedrest start time 1345  Pt started on PCA post procedure  Report given to JABIER KELLY [place], [address] [facility]

## 2023-03-06 NOTE — DISCHARGE INSTRUCTIONS
Embolization   AMBULATORY CARE:   What you need to know about embolization: Embolization is a procedure to create a clot, or block, in a blood vessel  This stops blood from flowing to the area  The procedure may be used to treat many conditions  It can help stop heavy bleeding (hemorrhage), or prevent an aneurysm from rupturing  An abnormal connection between arteries can be removed  Embolization can stop blood flow to a tumor, such as a uterine fibroid or a cancer tumor  Chemotherapy medicine may be given during an embolization to treat a cancer tumor  This is called chemoembolization  How to prepare for the procedure: Embolization is sometimes done as an emergency procedure  This means you will not have time to prepare  For an embolization that is not an emergency, the following are general guidelines for how to prepare:  Tell your provider about all your allergies  This includes if you have ever had an allergic reaction to contrast liquid, anesthesia, or antibiotics  You may be told not to eat or drink anything after midnight the night before your procedure  Arrange to have someone drive you home  The person should stay with you to help you and watch for problems that may develop  Give your provider a list of your medicines  Include all medicines and supplements you take  You may need to stop taking blood thinners or aspirin several days before your procedure  This will help decrease your risk for bleeding  Do not stop taking medicines unless your healthcare provider tells you to stop  Your provider will tell you which medicines to take or not take on the day of your procedure  You may need blood tests to check how well your blood clots and to check your kidney function  Depending on the reason for this procedure, you may an MRI, ultrasound, x-ray, or CT scan  These pictures will help your healthcare provider examine the area to be worked on       If you are a woman, tell your provider if you know or think you might be pregnant  You may not be able to have certain tests because they may harm an unborn baby  Your provider may need to take extra precautions for other tests  What will happen during the procedure: You may be given general anesthesia to keep you asleep and pain-free  You may instead be given moderate sedation  This means you will be awake during the procedure, but you should not feel any pain  Your provider will put numbing medicine on your skin where the procedure will be done  A small incision will be made over an artery  A catheter (thin tube) will be guided into the artery  Contrast liquid will be used to help your healthcare provider see your arteries more easily  Your provider will use a type of x-ray that gives a moving picture of the arteries  This will help him or her move the catheter into the right place  The catheter is moved up until it reaches the correct artery  Your provider will put medicine or a material into the artery to slow or stop blood from flowing  This may be a coil, foam, beads, a plug, or liquid  The liquid may also contain material that is larger than blood cells  Your provider will remove the catheter  Pressure will be used to stop any bleeding that happens  The incision area does not need to be closed with stitches  It will be small and close on its own  It will be covered with a bandage to keep it from becoming infected  What to expect after the procedure: You may have pain for a few days  Depending on the reason you had this procedure, you may also have a headache or cramps  You may have pain, bleeding, or bruising where the catheter went into your leg  All of these symptoms are normal and should get better soon  You may be given pain medicine through your IV or a pump  A pump allows you to control when the pain medicine is given  You should expect to stay in the hospital at least overnight   If you had this procedure to treat heavy bleeding, it may take 24 hours to know if the bleeding stopped  Healthcare providers will help you walk around after your procedure  This will help prevent blood clots  Do not get up until healthcare providers say it is okay  They may want you to lie in one position for a certain amount of time  When they say it is okay to walk, they will help you stand and walk safely  Risks of embolization: You may bleed more than expected or develop an infection  The area being treated may be damaged during the procedure  Your artery may be damaged from the catheter, or you may develop a blood clot  Your kidneys may be damaged from the contrast liquid  The material being put into the artery may go to the wrong place  This can stop blood flow to healthy tissue  The procedure may not work, or it may not relieve your symptoms  Call your doctor or specialist if:   You have a fever higher than 100 4°F (38°C)  You have a fever, pain, and nausea that last longer than 3 days  You suddenly have severe abdominal pain  You cannot urinate, or you urinate very little  You have signs of an infection at the catheter site, such as red streaks, pain, or swelling  You have new or worsening pain  You have questions or concerns about your condition or care  Medicines: You may need any of the following:  NSAIDs help decrease swelling and pain or fever  This medicine is available with or without a doctor's order  NSAIDs can cause stomach bleeding or kidney problems in certain people  If you take blood thinner medicine, always ask your healthcare provider if NSAIDs are safe for you  Always read the medicine label and follow directions  Prescription pain medicine may be given  Ask your healthcare provider how to take this medicine safely  Some prescription pain medicines contain acetaminophen  Do not take other medicines that contain acetaminophen without talking to your healthcare provider   Too much acetaminophen may cause liver damage  Prescription pain medicine may cause constipation  Ask your healthcare provider how to prevent or treat constipation  Take your medicine as directed  Contact your healthcare provider if you think your medicine is not helping or if you have side effects  Tell him or her if you are allergic to any medicine  Keep a list of the medicines, vitamins, and herbs you take  Include the amounts, and when and why you take them  Bring the list or the pill bottles to follow-up visits  Carry your medicine list with you in case of an emergency  Self-care:   Rest as needed  Rest and sleep will help your body heal      Follow your healthcare provider's instructions for activity  He or she will tell you when it is okay to return to your normal activities and to start driving  He or she may want you to wait 1 to 2 weeks to return to work  Care for the catheter site as directed  It is okay to shower after the procedure  You will only have a small cut in your skin from where the catheter went into your leg  Check the catheter site for signs of infection, including red streaks, pain, and swelling  Treat symptoms of postembolization syndrome  This syndrome is common after an embolization procedure  It usually starts within 72 hours of the procedure and may last a few days  The main symptoms are fever, pain, and nausea  You will probably be able to manage your symptoms at home  Acetaminophen or an NSAID, such as ibuprofen, can reduce a fever and pain  You may need to eat lightly to manage nausea  Drink more liquids for the first week after the procedure to prevent dehydration  Follow up with your doctor or specialist as directed: You may need to have more tests to check if the procedure worked  Write down your questions so you remember to ask them during your visits    © Copyright [address] Information is for End User's use only and may not be sold, redistributed or otherwise used for commercial purposes  All illustrations and images included in CareNotes® are the copyrighted property of [employer] M [employer]  or [employer]  The above information is an  only  It is not intended as medical advice for individual conditions or treatments  Talk to your doctor, nurse or pharmacist before following any medical regimen to see if it is safe and effective for you  Moderate Sedation   WHAT YOU NEED TO KNOW:   Moderate sedation, or conscious sedation, is medicine used during procedures to help you feel relaxed and calm  You will be awake and able to follow directions without anxiety or pain  You will remember little to none of the procedure  You may feel tired, weak, or unsteady on your feet after you get sedation  You may also have trouble concentrating or short-term memory loss  These symptoms should go away in 24 hours or less  DISCHARGE INSTRUCTIONS:   Call 911 or have someone else call for any of the following: You have sudden trouble breathing  You cannot be woken  Seek care immediately if:   You have a severe headache or dizziness  Your heart is beating faster than usual   Contact your healthcare provider if:   You have a fever  You have nausea or are vomiting for more than 8 hours after the procedure  Your skin is itchy, swollen, or you have a rash  You have questions or concerns about your condition or care  Self-care:   Have someone stay with you for 24 hours  This person can drive you to errands and help you do things around the house  This person can also watch for problems  Rest and do quiet activities for 24 hours  Do not exercise, ride a bike, or play sports  Stand up slowly to prevent dizziness and falls  Take short walks around the house with another person  Slowly return to your usual activities the next day  Do not drive or use dangerous machines or tools for 24 hours  You may injure yourself or others   Examples include a lawnmower, saw, or drill  Do not return to work for 24 hours if you use dangerous machines or tools for work  Do not make important decisions for 24 hours  For example, do not sign important papers or invest money  Drink liquids as directed  Liquids help flush the sedation medicine out of your body  Ask how much liquid to drink each day and which liquids are best for you  Eat small, frequent meals to prevent nausea and vomiting  Start with clear liquids such as juice or broth  If you do not vomit after clear liquids, you can eat your usual foods  Do not drink alcohol or take medicines that make you drowsy  This includes medicines that help you sleep and anxiety medicines  Ask your healthcare provider if it is safe for you to take pain medicine  Follow up with your healthcare provider as directed: Write down your questions so you remember to ask them during your visits  © 2017 [address] Information is for End User's use only and may not be sold, redistributed or otherwise used for commercial purposes  All illustrations and images included in CareNotes® are the copyrighted property of [employer]  or [employer]  The above information is an  only  It is not intended as medical advice for individual conditions or treatments  Talk to your doctor, nurse or pharmacist before following any medical regimen to see if it is safe and effective for you

## 2023-03-07 PROBLEM — R42 DIZZINESS: Status: ACTIVE | Noted: 2023-03-07

## 2023-03-07 PROBLEM — R25.2 MUSCLE CRAMPING: Status: ACTIVE | Noted: 2023-03-07

## 2023-03-07 LAB
ALBUMIN SERPL BCP-MCNC: 2.8 G/DL (ref 3.5–5)
ALP SERPL-CCNC: 30 U/L (ref 46–116)
ALT SERPL W P-5'-P-CCNC: 14 U/L (ref 12–78)
ANION GAP SERPL CALCULATED.3IONS-SCNC: 3 MMOL/L (ref 4–13)
AST SERPL W P-5'-P-CCNC: 17 U/L (ref 5–45)
ATRIAL RATE: 91 BPM
ATRIAL RATE: 96 BPM
BILIRUB SERPL-MCNC: 0.26 MG/DL (ref 0.2–1)
BUN SERPL-MCNC: 14 MG/DL (ref 5–25)
CALCIUM ALBUM COR SERPL-MCNC: 9.3 MG/DL (ref 8.3–10.1)
CALCIUM SERPL-MCNC: 8.3 MG/DL (ref 8.3–10.1)
CHLORIDE SERPL-SCNC: 112 MMOL/L (ref 96–108)
CO2 SERPL-SCNC: 23 MMOL/L (ref 21–32)
CREAT SERPL-MCNC: 0.7 MG/DL (ref 0.6–1.3)
ERYTHROCYTE [DISTWIDTH] IN BLOOD BY AUTOMATED COUNT: 12.2 % (ref 11.6–15.1)
GFR SERPL CREATININE-BSD FRML MDRD: 118 ML/MIN/1.73SQ M
GLUCOSE SERPL-MCNC: 137 MG/DL (ref 65–140)
HCT VFR BLD AUTO: 36.1 % (ref 34.8–46.1)
HGB BLD-MCNC: 11.8 G/DL (ref 11.5–15.4)
MCH RBC QN AUTO: 31.3 PG (ref 26.8–34.3)
MCHC RBC AUTO-ENTMCNC: 32.7 G/DL (ref 31.4–37.4)
MCV RBC AUTO: 96 FL (ref 82–98)
P AXIS: 70 DEGREES
P AXIS: 70 DEGREES
PLATELET # BLD AUTO: 292 THOUSANDS/UL (ref 149–390)
PMV BLD AUTO: 9.7 FL (ref 8.9–12.7)
POTASSIUM SERPL-SCNC: 3.6 MMOL/L (ref 3.5–5.3)
PR INTERVAL: 136 MS
PR INTERVAL: 138 MS
PROT SERPL-MCNC: 6.3 G/DL (ref 6.4–8.4)
QRS AXIS: 61 DEGREES
QRS AXIS: 61 DEGREES
QRSD INTERVAL: 86 MS
QRSD INTERVAL: 86 MS
QT INTERVAL: 372 MS
QT INTERVAL: 374 MS
QTC INTERVAL: 457 MS
QTC INTERVAL: 472 MS
RBC # BLD AUTO: 3.77 MILLION/UL (ref 3.81–5.12)
SODIUM SERPL-SCNC: 138 MMOL/L (ref 135–147)
T WAVE AXIS: 61 DEGREES
T WAVE AXIS: 61 DEGREES
VENTRICULAR RATE: 91 BPM
VENTRICULAR RATE: 96 BPM
WBC # BLD AUTO: 11.96 THOUSAND/UL (ref 4.31–10.16)

## 2023-03-07 RX ORDER — OXYCODONE HYDROCHLORIDE AND ACETAMINOPHEN 5; 325 MG/1; MG/1
1 TABLET ORAL EVERY 6 HOURS PRN
Status: DISCONTINUED | OUTPATIENT
Start: 2023-03-07 | End: 2023-03-08

## 2023-03-07 RX ORDER — SODIUM CHLORIDE 9 MG/ML
100 INJECTION, SOLUTION INTRAVENOUS CONTINUOUS
Status: DISCONTINUED | OUTPATIENT
Start: 2023-03-07 | End: 2023-03-07

## 2023-03-07 RX ORDER — SODIUM CHLORIDE 9 MG/ML
100 INJECTION, SOLUTION INTRAVENOUS CONTINUOUS
Status: DISCONTINUED | OUTPATIENT
Start: 2023-03-07 | End: 2023-03-09

## 2023-03-07 RX ADMIN — OXYCODONE HYDROCHLORIDE AND ACETAMINOPHEN 1 TABLET: 5; 325 TABLET ORAL at 20:29

## 2023-03-07 RX ADMIN — IBUPROFEN 600 MG: 600 TABLET ORAL at 07:56

## 2023-03-07 RX ADMIN — IBUPROFEN 600 MG: 600 TABLET ORAL at 16:05

## 2023-03-07 RX ADMIN — KETOROLAC TROMETHAMINE 15 MG: 30 INJECTION, SOLUTION INTRAMUSCULAR; INTRAVENOUS at 02:50

## 2023-03-07 RX ADMIN — SODIUM CHLORIDE 75 ML/HR: 0.9 INJECTION, SOLUTION INTRAVENOUS at 06:10

## 2023-03-07 RX ADMIN — OXYCODONE HYDROCHLORIDE AND ACETAMINOPHEN 1 TABLET: 5; 325 TABLET ORAL at 11:53

## 2023-03-07 RX ADMIN — ONDANSETRON 4 MG: 2 INJECTION INTRAMUSCULAR; INTRAVENOUS at 06:10

## 2023-03-07 NOTE — ASSESSMENT & PLAN NOTE
-pt with complaint of dizziness s/p elective uterine artery embolization for fibroids on 3/6; suspect 2/2 dehydration as pt reports she was not able to eat or drink prior to the procedure and has not been eating/drinking much since the procedure  -dizziness initially suspected to be caused by toradol given post op, as pt is opioid naive, however dizziness did not resolve after discontinuation of toradol, prompting IR transfer to [facility] on 3/07  -workup for dizziness completed by IR included: ECG on 3/6 (unremarkable, NSR @ 96), CMP on 3/6 (showing elevated Cl @ 110, otherwise electrolytes and kidney fxn WNL)  -no focal deficits appreciated on neuro exam, pt with difficulty moving legs 2/2 cramping   -CBC shows WBC 11 96 (to be expected s/p procedure per IR), otherwise WNL, CMP shows Cl of 112, otherwise WNL    Plan:  · NS at 100cc/hr, encouraged PO hydration   · Check I&O's

## 2023-03-07 NOTE — RESTORATIVE TECHNICIAN NOTE
Restorative Technician Note      Patient Name: Brit Santillan     Restorative Tech Visit Date: 03/07/23  Note Type: Mobility  Patient Position Upon Consult: Supine  Activity Performed: Repositioned; Dangled; Other (Comment) (seated EOB; pt limited due to dizziness)  Patient Position at End of Consult: Supine;  All needs within [facility]

## 2023-03-07 NOTE — ASSESSMENT & PLAN NOTE
-pt with c/o bilateral leg cramping, headache dizziness in context of elective uterine artery embolization on 3/6  -suspect 2/2 dehydration as pt reports she did not eat/drink anything prior to procedure and has not had much to eat or drink since   -CMP: elevated Cl to 112, otherwise electrolytes WNL    Plan:   · Encouraged PO fluid intake  · NS @ 100cc/hr

## 2023-03-07 NOTE — ASSESSMENT & PLAN NOTE
-s/p elective uterine artery embolization on 3/6; pt tolerated procedure well with no complications    -medically clear from IR's perspective, initially planned for d/c on 3/7 but transferred to [facility] given sx of dizziness, weakness      Plan:  · Current pain regimen: ibuprofen 600mg q8h scheduled, percocet 5-325 q6h prn breakthrough pain  · Monitor

## 2023-03-07 NOTE — PROGRESS NOTES
Progress Note -Interventional Radiology  Michael Cisneros 28 y o  female MRN: [id number]  Unit/Bed#: [facility] 906-01 Encounter: [id number]    Assessment/Plan:  Patient is a 29 y/o female with a past medical history of dysfunctional uterine bleeding secondary to fibroids who presented for uterine artery embolization today, which was technically successful with no immediate complications  POD #1 patient notes overall improvement of abdominal and groin pain, though still with some lower abdominal pain  Patient reports nausea and one episode of vomiting this morning, which improved with Zofran and patient able to tolerate breakfast  Patient reported constant dizziness overnight, which she describes as not being able to focus  She denies any associated double vision, numbness, tingling, weakness  Patient also notes intermittent chest pain and shortness of breath, which she attributes to anxiety attacks in which she becomes nervous when she attempts to fall asleep  Patient's family at bedside providing Malawian translation as needed, they declined formal Malawian translation services     -Patient with consistent urinary output from Mota, plan to d/c Mota this morning and monitor urine output  -Patient's right groin site mildly tender to palpation with no surrounding swelling, erythema, ecchymosis or groin hematoma noted  Dressing is clean and dry  Neurovascular intact and 5/5 strength in bilateral lower extremities  Mild lower abdominal tenderness soft, normal active bowel sounds   -Patient's vital stable  WBC mildly elevated at 10 22, Hgb stable at 14 1   -Patient's pain controlled, will d/c PCA pump of Dilaudid and transition to ibuprofen and Percocet pain regimen  -Abdominal and groin pain are consistent with expected post-op pain  Suspect constellation of dizziness, chest discomfort and shortness of breath secondary to dilaudid, since patient is narcotic naïve    While consideration was given to possible complications including PE or intracranial pathology, less likely given normal vital signs, benign neuro exam  Will d/c Dilaudid described above  -Plan to ambulate patient, monitor urine output post nicole removal, and ensure tolerate p o  with hopeful plans for discharge later today    -Update 11:45: informed by nurse that patient could not tolerate PT/ambulation  Will allow her to eat, rest, and reassess    1405: Patient reassessed after lunch  Patient able to tolerate without issue  Patient urinating without issue  Patient does note improvement of abdominal and groin pain after Percocet, but pain is returning  Patient continues to complain of dizziness, unable to sit up without symptoms  CN II through XII intact, patient speaking in full sentences, no focal deficits noted  Patient describes bilateral leg tingling, similar to legs falling asleep  Patient unable to hold legs against gravity bilaterally on assessment, though poor effort with exam   Sensation grossly intact in bilateral lower extremities, cap refill less than 2 seconds, bilateral PT pulses 2+  Patient is able to move bilateral toes, but will not flex or extend at ankle with resistance  Case discussed with Dr Vahe Haynes  Will plan on admission to family medicine for further evaluation of symptoms  1530: Discussed case with Dr Sebastian Ty, Family Medicine, who accepted to her service with Dr Mecca Maher        Patient Active Problem List   Diagnosis   • Left foot pain   • Overweight (BMI 25 0-29  9)   • Dysfunctional uterine bleeding   • Dizziness          Subjective: Swathi Morin is a 28 y o  female with a past medical history of dysfunctional uterine bleeding secondary to fibroids who presented for uterine artery embolization today, which was technically successful with no immediate complications  POD #1 patient notes overall improvement of abdominal and groin pain, though still with some lower abdominal pain    Patient reports nausea and one episode of NBNB vomiting this morning, which improved with Zofran and patient able to tolerate breakfast   Patient reported constant dizziness overnight, which she describes as not being able to focus  She denies any associated double vision, headache, numbness, tingling, weakness  Patient also notes intermittent chest pain and shortness of breath, which she attributes to anxiety attacks in which she becomes nervous when she attempts to fall asleep  Patient denies any associated pleuritic chest pain, current shortness of breath  Patient is passing gas, but has not had a bowel movement  Patient offers no other complaints and denies any fevers, chills, diaphoresis  Objective:    Vitals:  /74   Pulse 82   Temp (!) 97 3 °F (36 3 °C)   Resp 16   Ht 5' 3" (1 6 m)   Wt 76 2 kg (168 lb)   LMP  (Approximate) Comment: 4 months ago  SpO2 100%   BMI 29 76 kg/m²   Body mass index is 29 76 kg/m²  Weight (last 2 days)     Date/Time Weight    03/06/23 0915 76 2 (168)          I/Os:    Intake/Output Summary (Last 24 hours) at 3/7/2023 1536  Last data filed at 3/7/2023 1130  Gross per 24 hour   Intake 1563 6 ml   Output 2000 ml   Net -436 4 ml       Invasive Devices     Peripheral Intravenous Line  Duration           Peripheral IV 03/06/23 Left Antecubital 1 day                Physical Exam:  /74   Pulse 82   Temp (!) 97 3 °F (36 3 °C)   Resp 16   Ht 5' 3" (1 6 m)   Wt 76 2 kg (168 lb)   LMP  (Approximate) Comment: 4 months ago  SpO2 100%   BMI 29 76 kg/m²   General appearance: alert and oriented, in no acute distress  Head: Normocephalic, without obvious abnormality  Lungs: clear to auscultation bilaterally and No work of breathing, respiratory distress noted  Patient speaking in full sentences  Heart: regular rate and rhythm   Chest: Mild reproducible anterior chest wall tenderness to palpation    Abdomen: normal findings: bowel sounds normal and soft and mild tenderness to palpation in lower abdomen, no rebound tenderness noted  Extremities: extremities normal, warm and well-perfused; no cyanosis, clubbing, or edema, no edema, redness or tenderness in the calves or thighs and cap refill <2 sec, right groin site with clean, dry dressing  Mildly tender to palpation  No surrounding swelling, erythema, hematoma or ecchymosis noted  Pulses: 2+ and symmetric  Skin: normal   Neuro: CN II-XII grossly intact  5/5 strength in bilateral upper and lower extremities  Sensation intact in all 4 extremities  No nystagmus noted  No focal deficits noted                  Lab Results and Cultures:   CBC with diff:   Lab Results   Component Value Date    WBC 10 22 (H) 03/06/2023    HGB 14 1 03/06/2023    HCT 42 4 03/06/2023    MCV 96 03/06/2023     03/06/2023    MCH 31 8 03/06/2023    MCHC 33 3 03/06/2023    RDW 11 9 03/06/2023    MPV 9 8 03/06/2023    NRBC 0 03/06/2023      BMP/CMP:  Lab Results   Component Value Date    K 3 7 03/06/2023     (H) 03/06/2023    CO2 21 03/06/2023    BUN 11 03/06/2023    CREATININE 0 77 03/06/2023    CALCIUM 8 8 03/06/2023    AST 19 10/03/2022    ALT 21 10/03/2022    ALKPHOS 46 10/03/2022    EGFR 105 03/06/2023   ,     Coags:   Lab Results   Component Value Date    INR 0 99 03/06/2023   ,   Results from last 7 days   Lab Units 03/06/23  0920   INR  0 99        Lipid Panel: No results found for: CHOL  Lab Results   Component Value Date    HDL 55 06/01/2021     Lab Results   Component Value Date    HDL 55 06/01/2021     Lab Results   Component Value Date    LDLCALC 69 06/01/2021     Lab Results   Component Value Date    TRIG 78 06/01/2021       HgbA1c: No results found for: HGBA1C    Blood Culture: No results found for: BLOODCX,   Urinalysis:   Lab Results   Component Value Date    COLORU yellow 01/19/2023    COLORU Yellow 09/21/2021    CLARITYU clear 01/19/2023    CLARITYU Clear 09/21/2021    SPECGRAV 1 015 09/21/2021    PHUR 6 5 09/21/2021    LEUKOCYTESUR - 01/19/2023 LEUKOCYTESUR Trace (A) 09/21/2021    NITRITE - 01/19/2023    NITRITE Negative 09/21/2021    GLUCOSEU normal 01/19/2023    GLUCOSEU Negative 09/21/2021    KETONESU - 01/19/2023    KETONESU Negative 09/21/2021    BILIRUBINUR - 01/19/2023    BILIRUBINUR Negative 09/21/2021    BLOODU trace 01/19/2023    BLOODU Negative 09/21/2021   ,   Urine Culture:   Lab Results   Component Value Date    URINECX >100,000 cfu/ml Proteus mirabilis (A) 09/21/2021   ,   Wound Culure:  No results found for: WOUNDCULT      Thank you for allowing me to participate in the care of Elta Murders  Please don't hesitate to call, text, email, or TigerText with any questions  This text is generated with voice recognition software  There may be translation, syntax,  or grammatical errors  If you have any questions, please contact the dictating provider

## 2023-03-07 NOTE — PROGRESS NOTES
At 1612 pt c/o dizziness & feet feeling crampy  Pt has tremors & is tachycardic  Texted Dr Nelida Belcher but waiting for reply  Will continue to monitor  1704 received message that he was scrubbed in & to notify Dr Christiano Bae  Notified Dr Christiano Bae & he was coming to assess pt  Tremors have subsided at this point tachycardia is no longer bounding  Pt is appearing more comfortable  Continuing to monitor & obtaining labs & EKG

## 2023-03-07 NOTE — NURSING NOTE
Patient complaining of slight pain in the IV site  Fluids put on hold  Patient declining the new iv site  IR practitioner on call notified

## 2023-03-07 NOTE — QUICK NOTE
Spoke with Dr Lorenzo Munson from IR  Dr Lorenzo Munson explained process of uterine artery embolization, as well as symptoms concerning for major post op complication  I expressed to Dr Lorenzo Munson that I did not appreciate any such symptoms/ signs that concerned me for a major complication when I saw her at bedside this afternoon  I suspect the muscle cramping, dizziness, and headache she is currently experiencing are secondary to dehydration as she has not been drinking much since before or prior to the procedure  Explained that we will start her on IV fluids and check CMP, CBC  Dr Lorenzo Munson expresses agreement with plan at this time, states IR will continue to follow patient while she is in the hospital  Recommends low threshold to reach out to IR should pt develop any concerning signs/symptoms

## 2023-03-08 PROCEDURE — 04LF3DU OCCLUSION OF LEFT UTERINE ARTERY WITH INTRALUMINAL DEVICE, PERCUTANEOUS APPROACH: ICD-10-PCS | Performed by: RADIOLOGY

## 2023-03-08 PROCEDURE — 04LE3DT OCCLUSION OF RIGHT UTERINE ARTERY WITH INTRALUMINAL DEVICE, PERCUTANEOUS APPROACH: ICD-10-PCS | Performed by: RADIOLOGY

## 2023-03-08 RX ORDER — DIPHENHYDRAMINE HYDROCHLORIDE 50 MG/ML
50 INJECTION INTRAMUSCULAR; INTRAVENOUS EVERY 6 HOURS PRN
Status: DISCONTINUED | OUTPATIENT
Start: 2023-03-08 | End: 2023-03-09 | Stop reason: HOSPADM

## 2023-03-08 RX ORDER — OXYCODONE HYDROCHLORIDE AND ACETAMINOPHEN 5; 325 MG/1; MG/1
1 TABLET ORAL EVERY 6 HOURS PRN
Qty: 10 TABLET | Refills: 0 | Status: SHIPPED | OUTPATIENT
Start: 2023-03-08 | End: 2023-03-18

## 2023-03-08 RX ORDER — OXYCODONE HYDROCHLORIDE 5 MG/1
5 TABLET ORAL EVERY 4 HOURS PRN
Status: DISCONTINUED | OUTPATIENT
Start: 2023-03-08 | End: 2023-03-09 | Stop reason: HOSPADM

## 2023-03-08 RX ORDER — DOCUSATE SODIUM 100 MG/1
100 CAPSULE, LIQUID FILLED ORAL 2 TIMES DAILY
Status: DISCONTINUED | OUTPATIENT
Start: 2023-03-08 | End: 2023-03-09 | Stop reason: HOSPADM

## 2023-03-08 RX ORDER — TRAMADOL HYDROCHLORIDE 50 MG/1
50 TABLET ORAL EVERY 6 HOURS PRN
Status: DISCONTINUED | OUTPATIENT
Start: 2023-03-08 | End: 2023-03-08

## 2023-03-08 RX ORDER — IBUPROFEN 600 MG/1
600 TABLET ORAL EVERY 6 HOURS PRN
Status: DISCONTINUED | OUTPATIENT
Start: 2023-03-08 | End: 2023-03-08

## 2023-03-08 RX ORDER — KETOROLAC TROMETHAMINE 30 MG/ML
15 INJECTION, SOLUTION INTRAMUSCULAR; INTRAVENOUS 3 TIMES DAILY
Status: COMPLETED | OUTPATIENT
Start: 2023-03-08 | End: 2023-03-09

## 2023-03-08 RX ORDER — OXYCODONE HCL 10 MG/1
10 TABLET, FILM COATED, EXTENDED RELEASE ORAL EVERY 12 HOURS SCHEDULED
Status: DISCONTINUED | OUTPATIENT
Start: 2023-03-08 | End: 2023-03-08

## 2023-03-08 RX ORDER — CEFAZOLIN SODIUM 2 G/50ML
2000 SOLUTION INTRAVENOUS EVERY 8 HOURS
Status: DISCONTINUED | OUTPATIENT
Start: 2023-03-08 | End: 2023-03-09

## 2023-03-08 RX ORDER — PREDNISONE 20 MG/1
40 TABLET ORAL ONCE
Status: COMPLETED | OUTPATIENT
Start: 2023-03-08 | End: 2023-03-08

## 2023-03-08 RX ORDER — AMOXICILLIN 250 MG
1 CAPSULE ORAL
Status: DISCONTINUED | OUTPATIENT
Start: 2023-03-08 | End: 2023-03-08

## 2023-03-08 RX ORDER — POLYETHYLENE GLYCOL 3350 17 G/17G
17 POWDER, FOR SOLUTION ORAL ONCE
Status: COMPLETED | OUTPATIENT
Start: 2023-03-08 | End: 2023-03-08

## 2023-03-08 RX ORDER — OXYCODONE HYDROCHLORIDE 5 MG/1
2.5 TABLET ORAL EVERY 4 HOURS PRN
Status: DISCONTINUED | OUTPATIENT
Start: 2023-03-08 | End: 2023-03-09 | Stop reason: HOSPADM

## 2023-03-08 RX ORDER — DIPHENHYDRAMINE HCL 25 MG
25 TABLET ORAL EVERY 6 HOURS PRN
Status: DISCONTINUED | OUTPATIENT
Start: 2023-03-08 | End: 2023-03-08

## 2023-03-08 RX ORDER — KETOROLAC TROMETHAMINE 30 MG/ML
30 INJECTION, SOLUTION INTRAMUSCULAR; INTRAVENOUS 3 TIMES DAILY
Status: DISCONTINUED | OUTPATIENT
Start: 2023-03-08 | End: 2023-03-08

## 2023-03-08 RX ORDER — DIAPER,BRIEF,INFANT-TODD,DISP
EACH MISCELLANEOUS 2 TIMES DAILY
Status: DISCONTINUED | OUTPATIENT
Start: 2023-03-08 | End: 2023-03-08

## 2023-03-08 RX ORDER — ACETAMINOPHEN 325 MG/1
650 TABLET ORAL EVERY 6 HOURS PRN
Status: DISCONTINUED | OUTPATIENT
Start: 2023-03-08 | End: 2023-03-09 | Stop reason: HOSPADM

## 2023-03-08 RX ORDER — SENNOSIDES 8.6 MG
8.6 TABLET ORAL
Qty: 10 TABLET | Refills: 0 | Status: SHIPPED | OUTPATIENT
Start: 2023-03-08

## 2023-03-08 RX ADMIN — KETOROLAC TROMETHAMINE 15 MG: 30 INJECTION, SOLUTION INTRAMUSCULAR; INTRAVENOUS at 17:04

## 2023-03-08 RX ADMIN — SODIUM CHLORIDE 100 ML/HR: 0.9 INJECTION, SOLUTION INTRAVENOUS at 22:20

## 2023-03-08 RX ADMIN — HYDROCORTISONE: 1 OINTMENT TOPICAL at 11:52

## 2023-03-08 RX ADMIN — OXYCODONE HYDROCHLORIDE AND ACETAMINOPHEN 1 TABLET: 5; 325 TABLET ORAL at 06:50

## 2023-03-08 RX ADMIN — CEFAZOLIN SODIUM 2000 MG: 2 SOLUTION INTRAVENOUS at 20:18

## 2023-03-08 RX ADMIN — SODIUM CHLORIDE 100 ML/HR: 0.9 INJECTION, SOLUTION INTRAVENOUS at 00:05

## 2023-03-08 RX ADMIN — OXYCODONE HYDROCHLORIDE 10 MG: 10 TABLET, FILM COATED, EXTENDED RELEASE ORAL at 13:20

## 2023-03-08 RX ADMIN — KETOROLAC TROMETHAMINE 15 MG: 30 INJECTION, SOLUTION INTRAMUSCULAR; INTRAVENOUS at 20:15

## 2023-03-08 RX ADMIN — PREDNISONE 40 MG: 20 TABLET ORAL at 13:21

## 2023-03-08 RX ADMIN — POLYETHYLENE GLYCOL 3350 17 G: 17 POWDER, FOR SOLUTION ORAL at 11:56

## 2023-03-08 RX ADMIN — DOCUSATE SODIUM 100 MG: 100 CAPSULE, LIQUID FILLED ORAL at 17:04

## 2023-03-08 RX ADMIN — OXYCODONE HYDROCHLORIDE 5 MG: 5 TABLET ORAL at 20:15

## 2023-03-08 RX ADMIN — DIPHENHYDRAMINE HYDROCHLORIDE 50 MG: 50 INJECTION, SOLUTION INTRAMUSCULAR; INTRAVENOUS at 20:15

## 2023-03-08 RX ADMIN — IBUPROFEN 600 MG: 600 TABLET ORAL at 08:55

## 2023-03-08 RX ADMIN — CEFAZOLIN SODIUM 2000 MG: 2 SOLUTION INTRAVENOUS at 13:21

## 2023-03-08 RX ADMIN — IBUPROFEN 600 MG: 600 TABLET ORAL at 00:03

## 2023-03-08 NOTE — QUICK NOTE
QUICK NOTES - Family Medicine Residency, [place]    I have seen patient at bedside on 03/08/23 at approximately 4:43 PM to re-evaluate patient's buttock rash  Both patient and her sons report erythema and burning is improving but still have pruritus  Exam shows erythematous rash on bilateral buttocks, R > L, with increased warmth and induration  No fluctuance or blisters noted  Tender to palpation  Picture was taken and placed in chart  Assessment: Rash suspected cellulitis vs contact dermatitis vs atypical urticaria    Plan:  - Continue Ancef 2g Q8H  - Benadryl for pruritus  - Remainder of management per most recent progress note      Monisha Cotter MD  PGY-2, [facility] Family [facility]  03/08/23, 4:46 PM

## 2023-03-08 NOTE — DISCHARGE SUMMARY
DISCHARGE SUMMARY - Family Medicine Residency, [place] Cruz 1995, 28 y o  female  MRN: [id number]    Unit/Bed#: [facility] 906-01 Encounter: [id number]  Primary Care Provider: Nicole Carroll MD      Admission Date: 3/6/2023  Discharge Date: 03/08/23  Length of Stay: 0 days  Diagnosis:   Principal Problem:    Dysfunctional uterine bleeding  Active Problems:    Dizziness    Muscle cramping  Resolved Problems:    * No resolved hospital problems  *        HPI: (per admission H&P note on 3/7/23)      "Danilo Mattson is a 28 y o  female who presents with past medical history of dysfunctional uterine bleeding secondary to fibroids who presented for uterine artery embolization on 3/6, which was successful with no immediate complications  POD #1 patient noted overall improvement of abdominal and groin pain, though still with some lower abdominal pain  Reported nausea and one episode of vomiting this morning, which improved with Zofran and patient able to tolerate breakfast  Patient reported constant dizziness overnight, which she describes as not being able to focus  She denied any associated double vision, numbness, tingling, weakness  Patient also noted intermittent chest pain and shortness of breath, which she attributed to anxiety attacks in which she becomes nervous when she attempts to fall asleep  Patient was reassessed in the afternoon of POD#1 and she was urinating without difficulty and had relief with thigh pain after Percocet  Patient described bilateral leg tingling, similar to legs falling asleep  Patient unable to hold legs against gravity bilaterally on assessment, though poor effort with exam  She was admitted to the [facility] team for observation overnight for continued dizziness and subjective weakness  Pt seen and examined at bedside  Complains of headache, and intermittent dizziness sensation "like the room is spinning,"  as well as cramping in her bilateral lower legs  States she has not had much to drink since prior to the procedure  "      HOSPITAL COURSE:   Please see progress note on discharge day for detailed list of diagnoses and related plan for additional information  Hospital Course:   28 y o  female admitted on 3/6/2023 for muscle cramps and dizziness as stated above  Clinical improvement was seen with IVF and pain control  She was medically cleared for d/c on ***  On 03/08/23, HD# 0, pt remains stable and is medically cleared for discharge  Patient will need to make close follow-up appointment with PCP and additional providers listed on AVS     Patient is informed of and is aware of current health status and understand the plan of treatment and outpatient follow-up  The patient understood and agreed with the plan  All pertinent lab results, imaging studies, procedures, and/or any incidental findings have been disclosed to the patient  All pertinent questions are answered to patient's satisfaction  Complications: NONE     Condition at Discharge: stable       PROCEDURES:     Procedures Performed:     No orders of the defined types were placed in this encounter  SIGNIFICANT FINDINGS / ABNORMAL RESULTS:     Significant Findings/Abnormal Results with this admission:    IR uterine artery embolization    Result Date: 3/7/2023  Narrative: Uterine artery embolization Ultrasound-guided access right common femoral artery Clinical history: Dysfunctional uterine bleeding, pelvic pain  Fibroids  Contrast: 130 mL Visipaque Fluoro time: 28 6 minutes Additional medications: Ancef, Decadron, Zofran, intra-arterial nitroglycerin, intra-arterial lidocaine Conscious sedation time: 2 hours Procedure: The patient was brought to the interventional radiology suite and identified verbally and by wrist band  The patient was placed supine on the table and the right groin was prepped and draped in the usual sterile fashion  A Mota catheter was placed   The right groin was prepped and draped in sterile fashion  All elements of maximal sterile barrier technique were followed (cap, mask, sterile gown, sterile gloves, large sterile sheet, hand hygiene and 2% chlorhexidine for cutaneous antisepsis)  Sterile ultrasound technique with sterile gel and sterile probe cover's was also utilized  30 mg of intravenous Toradol was administered prior to the procedure  Under ultrasound, the right common femoral artery was identified  Under ultrasound guidance, lidocaine was administered to the skin and underlying soft tissues  A small skin incision was made  Under continuous ultrasound guidance, the right common femoral artery was punctured with return of pulsatile red blood  A static ultrasound image was saved  A microwire was advanced through the needle into the abdominal aorta under fluoroscopy  The microneedle was exchanged for micropuncture sheath, and the wire and inner dilator were removed  Next, a [employer] wire was advanced into the abdominal aorta over which a 5 [employer] vascular sheath was inserted and connected to a flush bag  An Omni Flush catheter was advanced to the level of the aortic bifurcation and pelvic angiogram was performed  An angled glide catheter was now used to select the left uterine artery and angiogram performed  A 2 8-Serbian high flow microcatheter was advanced into the left uterine artery over a fathom wire  Selective left uterine artery arteriography was performed  A more distal location within this artery was selected  Nitroglycerin was administered  From this location, the left uterine artery was embolized with 500-700 micron merit medical Embospheres  Interval angiography was performed  This was followed by embolization with 700-900 micron merit medical Embospheres  Interval angiography was performed  1 mL of preservative-free lidocaine was administered  The microcatheter was removed   A Gonzalez uterine catheter was formed in the aorta and used to select the right internal iliac artery  Angiography was performed  Despite multiple obliques we were not able to select the appropriate artery with the microcatheter  A sos 0 catheter was now formed and used to select the right internal iliac artery  Angiograms were taken from multiple obliques  A 2 8-Tajik high flow microcatheter was advanced into the right uterine artery over a fathom wire  Selective right uterine artery arteriography was performed  A more distal location within this artery was selected  Nitroglycerin was administered  From this location, the right uterine artery was embolized with a small amount of 500-700 micron merit medical Embospheres  Interval angiography was performed  This was followed by embolization with a trace amount of 700-900 micron merit medical Embospheres  Interval angiography was performed  1 mL of preservative-free lidocaine was administered  The microcatheter was removed  In total, 2 vials of 500-700 micron merit medical Embospheres and 1 vial of 700-900 micron merit medical Embospheres were utilized  Approximately 85-90% of the embolization was performed from the left sided uterine artery  Right common femoral artery angiogram was performed  Hemostasis was achieved with a vascade device  Patient tolerated the procedure without immediate complication Findings: Arteriography demonstrates an enlarged fibroid uterus  The left uterine artery appears dominant  Following embolization, near arterial stasis is present within both uterine arteries  Two large dominant fibroids are apparent as seen on prior MRI  After embolization there is continued filling of cervical branches and what appears to be a left ovarian collateralization  Patent right common femoral artery with puncture site amenable for device closure     Impression: Impression: Uterine artery embolization  Two dominant fibroids predominately supplied by the left uterine artery   Workstation performed: [id number] VITALS ON DISCHARGE DATE:     Vitals  Blood Pressure: 112/70 (03/08/23 0712)  Temperature: 97 5 °F (36 4 °C) (03/08/23 0712)  Temp Source: Temporal (03/07/23 1419)  Pulse: 71 (03/08/23 0712)  Respirations: 19 (03/08/23 0712)  SpO2: 97 % (03/08/23 0712)  Height: 5' 3" (160 cm) (03/06/23 0915)  Weight - Scale: 76 2 kg (168 lb) (03/06/23 0915)    Temp:  [97 3 °F (36 3 °C)-97 7 °F (36 5 °C)] 97 5 °F (36 4 °C)  HR:  [71-86] 71  Resp:  [16-20] 19  BP: (112-131)/(70-75) 112/70    Weight (last 2 days)       Date/Time Weight    03/06/23 0915 76 2 (168)              Intake/Output Summary (Last 24 hours) at 3/8/2023 0931  Last data filed at 3/7/2023 1800  Gross per 24 hour   Intake 640 ml   Output 500 ml   Net 140 ml       Invasive Devices       Peripheral Intravenous Line  Duration             Peripheral IV 03/07/23 Dorsal (posterior); Right Hand <1 day                      PHYSICAL EXAM ON DAY OF DISCHARGE:     Physical Exam      DISCHARGE MEDICATIONS:     Discharge Medications:  See after visit summary (AVS) for detailed reconciled discharge medications, which was provided to patient and family  Summary of medication changes made with this admission:    START:  Percocet 5-325mg every 6 hours for breakthrough pain     Ibuprofen every 6 hours as needed for pain   Miralax daily     RESUME:  All other home medications       FOLLOW-UP APPOINTMENTS / INSTRUCTION :     Important Physician Related Follow Up:     Richard Foster MD  [address]  [address] [facility]  [place] [address] [facility]  [phone]    Follow up          Comfirmed future (up-coming) appointments:  Future Appointments   Date Time Provider Sarahi Weiss   4/10/2023  3:45 PM [facility] [address] [facility] & NURSING HOME [place] [facility]   4/17/2023  3:30 PM Emaline [facility], [address] [place] [address] & NURSING HOME [facility] HOME   6/12/2023  1:00 PM BRYANNA Sneed  [address] [facility]       Discharge instructions/Information to patient and family:   See after visit summary (AVS) for information provided to patient and family  Provisions for Follow-Up Care:  See after visit summary for information related to follow-up care and any pertinent home health orders  DISPOSITION:     Disposition: Home    Discharge Statement   I spent 30 minutes discharging the patient  This time was spent on the day of discharge  I had direct contact with the patient on the day of discharge  Additional documentation is required if more than 30 minutes were spent on discharge  Planned Readmission: Nancy Hernandez DO  PGY-1, Family Medicine  03/08/23  9:31 AM      *** Did you refresh AND change the service date/time?

## 2023-03-08 NOTE — UTILIZATION REVIEW
Continued Stay Review    [facility] 03-07-23 @ 0917 CONVERTED TO INPATIENT 03-08-23 @ 1638 FOR CONTINUATION OF CARE  UTERINE ARTERY EMBOLIZATION AND THE NEED FOR PAIN CONTROL  Inpatient Admission  Once        Transfer Service: Family Medicine       Question Answer Comment   Level of Care Med Surg    Estimated length of stay Not Applicable        93/45/33 [id number]       Date: *03-08-23                         Current Patient Class: *inpatient Current Level of Care: medical    HPI:27 y o  female initially admitted on 03-07-23     Assessment/Plan:  She is a 40-year-old female status post uterine artery embolization yesterday per IR  Was admitted due to concerns of dizziness  Patient was given IV fluids overnight  At the time of evaluation, continues to complain of pain lower abdomen as well as pain in her buttocks  On examination has well demarcated area of erythema on her right buttocks extending to left buttock, very tender to touch, tense  Differentials include cellulitis, urticaria, pressure injury  Will treat with IV Ancef, Benadryl and prednisone        03-09-23 BL, pruritic and tender gluteal rash discovered on 3/8 Itchy, ddx includes cellulitis or heat rash, allergic reaction Treat for cellulitis with IV Ancef q 8 hrs  potential allergic reaction with benadryl prn  muscle cramping and dizziness is much improved since being on IV fluids  Still experiencing abdominal pain and pain around her buttocks where the rash is present  On exam  Erythema and rash (well demarcated erythematous rash on left medical buttock, with extension to right medial buttock  Tender to palpation) abdominal tenderness  There is guarding         Vital Signs:     Date/Time Temp Pulse Resp BP MAP (mmHg) SpO2 FiO2 (%) Calculated FIO2 (%) - Nasal Cannula Nasal Cannula O2 Flow Rate (L/min) O2 Device Patient Position - Orthostatic VS   03/08/23 15:26:17 97 3 °F (36 3 °C) Abnormal  68 -- 122/79 93 98 % -- -- -- -- --   03/08/23 07:12:16 97 5 °F (36 4 °C) 71 19 112/70 84 97 % -- -- -- -- --   03/07/23 21:36:44 97 5 °F (36 4 °C) 86 16 123/75 91 95 % -- -- -- -- --   03/07/23 19:15:47 97 7 °F (36 5 °C) 85 20 123/74 90 97 % -- -- -- -- --   03/07/23 15:27:40 97 3 °F (36 3 °C) Abnormal  82 16 131/74 93 100 % -- -- -- -- --   03/07/23 14:19:43 97 3 °F (36 3 °C) Abnormal  80 18 116/70 85 98 % -- -- -- None (Room air) Lying   03/07/23 11:21:54 97 3 °F (36 3 °C) Abnormal  77 16 115/71 86 99 % -- -- -- -- --   03/07/23 0900 -- -- -- -- -- -- -- -- -- Nasal cannula --   03/07/23 06:30:49 97 °F (36 1 °C) Abnormal  77 18 112/70 84 99 % -- -- -- -- --   03/07/23 0609 97 2 °F (36 2 °C) Abnormal  78 18 121/72 88 -- -- -- -- -- --   03/07/23 02:49:55 97 °F (36 1 °C) Abnormal  75 16 121/72 88 99 % -- -- -- -- --   03/06/23 22:54:22 97 °F (36 1 °C) Abnormal  93 20 127/78 94 99 % -- -- -- Nasal cannula --   03/06/23 2242 -- -- -- -- -- -- -- 28 2 L/min Nasal cannula --     Pertinent Labs/Diagnostic Results:       Results from last 7 days   Lab Units 03/07/23  1705 03/06/23  1806 03/06/23  0920   WBC Thousand/uL 11 96* 10 22* 6 56   HEMOGLOBIN g/dL 11 8 14 1 13 8   HEMATOCRIT % 36 1 42 4 43 1   PLATELETS Thousands/uL 292 355 338   NEUTROS ABS Thousands/µL  --  9 13*  --          Results from last 7 days   Lab Units 03/07/23  1705 03/06/23  1806 03/06/23  0920   SODIUM mmol/L 138 136 137   POTASSIUM mmol/L 3 6 3 7 3 8   CHLORIDE mmol/L 112* 110* 110*   CO2 mmol/L 23 21 25   ANION GAP mmol/L 3* 5 2*   BUN mg/dL 14 11 10   CREATININE mg/dL 0 70 0 77 0 81   EGFR ml/min/1 73sq m 118 105 99   CALCIUM mg/dL 8 3 8 8 9 0   MAGNESIUM mg/dL  --  2 1  --      Results from last 7 days   Lab Units 03/07/23  1705   AST U/L 17   ALT U/L 14   ALK PHOS U/L 30*   TOTAL PROTEIN g/dL 6 3*   ALBUMIN g/dL 2 8*   TOTAL BILIRUBIN mg/dL 0 26         Results from last 7 days   Lab Units 03/07/23  1705 03/06/23  1806 03/06/23  0920   GLUCOSE RANDOM mg/dL 137 106 100     Results from last 7 days   Lab Units 03/06/23  0920   PROTIME seconds 13 3   INR  0 99             Results from last 7 days   Lab Units 03/06/23  1806   LACTIC ACID mmol/L 1 7     Medications:   Scheduled Medications:  cefazolin, 2,000 mg, Intravenous, Q8H  docusate sodium, 100 mg, Oral, BID  ketorolac, 15 mg, Intravenous, TID      Continuous IV Infusions:  sodium chloride, 100 mL/hr, Intravenous, Continuous      PRN Meds:  acetaminophen, 650 mg, Oral, Q6H PRN  diphenhydrAMINE, 25 mg, Oral, Q6H PRN  naloxone, 0 04 mg, Intravenous, Q3 min PRN  ondansetron, 4 mg, Intravenous, Q6H PRN  oxyCODONE, 2 5 mg, Oral, Q4H PRN  oxyCODONE, 5 mg, Oral, Q4H PRN        Discharge Plan: [facility]      Network Utilization Review Department  ATTENTION: Please call with any questions or concerns to [phone] and carefully listen to the prompts so that you are directed to the right person  All voicemails are confidential   Adelina Finder all requests for admission clinical reviews, approved or denied determinations and any other requests to dedicated fax number below belonging to the campus where the patient is receiving treatment   List of dedicated fax numbers for the Facilities:  [address] DENIALS (Administrative/Medical Necessity) [phone]   [address] (Maternity/NICU/Pediatrics) [phone]   [address] [phone]   [facility] [phone]   [address] [place] [address] John Jaimes 28 [phone]   [address] [place] [facility] [address] [phone]

## 2023-03-08 NOTE — QUICK NOTE
Patient was seen part of evening rounds  Patient was in bed with partner at bedside who translated  Patient stated she feels significantly better compared to yesterday but still continues with abdominal pain and muscle cramping  Denies chest pain, sob  VSS  PE: RRR, CTAB, IR incision site covered with gauze with no gross bleeding seen  Plan to continue to monitor patient overnight for any changes in signs or symptoms  Nurse was also informed to contact night resident for any concerns

## 2023-03-08 NOTE — RESTORATIVE TECHNICIAN NOTE
Restorative Technician Note      Patient Name: Abilio Merida     Restorative Tech Visit Date: 03/08/23  Note Type: Mobility  Patient Position Upon Consult: Supine  Activity Performed: Ambulated  Assistive Device: Roller walker  Patient Position at End of Consult: Supine;  All needs within [facility]

## 2023-03-08 NOTE — PROGRESS NOTES
Progress Note -Interventional Radiology NP  Heather Mclaughlin 28 y o  female MRN: [id number]  Unit/Bed#: [facility] 906-01 Encounter: [id number]    Assessment/Plan:    Assessment/Plan:    Patient is a 29 y/o female with a past medical history of dysfunctional uterine bleeding secondary to fibroids who presented for uterine artery embolization on 3/6, which was technically successful with no immediate complications  POD#1 she had a some nausea and vomiting, chest pain, SOB, difficulty ambulating d/t numbness in her legs, nicole removed in the AM, dizziness and extreme abdominal pain  POD#2 (3/8) - patients pain is more tolerable until the pain medication begins to wear off, and then becomes tearful  Patient now has a profound painful area of erythema on her left buttock  This is painful to lay on, has dimpling, a clear area of delineation, exquisitely tender to touch, and slightly firm  Family medicine currently treating with hydrocortisone and IV ancef Q8 hours  Made changes in patients pain medications to see if that helps with patients pain long term and breakthrough medications  All concerns were addressed with significant other bedside who provided translation as well between myself and patient  Major points of discussion included pain management for at home and the new buttock pain  - monitor to see if pain medication changes help with pain long term   - monitor buttocks for changes in skin s/p starting antibiotics   - IR to follow up tomorrow to assess patient for changes in medications   - please reach out to IR in the interim for any further questions or concerns    Subjective: Heather Mclaughlin is a 28 y o  female who presented with outpatient uterine fibroid embolization  She continues to have abdominal pain  Discussed with s/o and patient bedside plan to adjust medications  Patient Active Problem List   Diagnosis   • Left foot pain   • Overweight (BMI 25 0-29  9)   • Dysfunctional uterine bleeding   • Dizziness   • Muscle cramping          Objective:    Vitals:  /70   Pulse 71   Temp 97 5 °F (36 4 °C)   Resp 19   Ht 5' 3" (1 6 m)   Wt 76 2 kg (168 lb)   LMP  (Approximate) Comment: 4 months ago  SpO2 97%   BMI 29 76 kg/m²   Body mass index is 29 76 kg/m²  Weight (last 2 days)     Date/Time Weight    03/06/23 0915 76 2 (168)          I/Os:    Intake/Output Summary (Last 24 hours) at 3/8/2023 1329  Last data filed at 3/7/2023 1800  Gross per 24 hour   Intake 640 ml   Output --   Net 640 ml       Invasive Devices     Peripheral Intravenous Line  Duration           Peripheral IV 03/07/23 Dorsal (posterior); Right Hand <1 day                Physical Exam:  Physical Exam  Vitals and nursing note reviewed  Constitutional:       General: She is not in acute distress  Appearance: She is well-developed  HENT:      Head: Normocephalic and atraumatic  Eyes:      Conjunctiva/sclera: Conjunctivae normal    Cardiovascular:      Rate and Rhythm: Normal rate and regular rhythm  Heart sounds: No murmur heard  Pulmonary:      Effort: Pulmonary effort is normal  No respiratory distress  Abdominal:      Palpations: Abdomen is soft  Tenderness: There is abdominal tenderness  Musculoskeletal:         General: No swelling  Cervical back: Neck supple  Skin:     General: Skin is warm and dry  Capillary Refill: Capillary refill takes less than 2 seconds  Findings: Erythema (left buttock) present  Neurological:      Mental Status: She is alert     Psychiatric:         Mood and Affect: Mood normal                       Lab Results and Cultures:   CBC with diff:   Lab Results   Component Value Date    WBC 11 96 (H) 03/07/2023    HGB 11 8 03/07/2023    HCT 36 1 03/07/2023    MCV 96 03/07/2023     03/07/2023    MCH 31 3 03/07/2023    MCHC 32 7 03/07/2023    RDW 12 2 03/07/2023    MPV 9 7 03/07/2023    NRBC 0 03/06/2023      BMP/CMP:  Lab Results   Component Value Date K 3 6 03/07/2023     (H) 03/07/2023    CO2 23 03/07/2023    BUN 14 03/07/2023    CREATININE 0 70 03/07/2023    CALCIUM 8 3 03/07/2023    AST 17 03/07/2023    ALT 14 03/07/2023    ALKPHOS 30 (L) 03/07/2023    EGFR 118 03/07/2023   ,     Coags:   Lab Results   Component Value Date    INR 0 99 03/06/2023   ,   Results from last 7 days   Lab Units 03/06/23  0920   INR  0 99        Lipid Panel: No results found for: CHOL  Lab Results   Component Value Date    HDL 55 06/01/2021     Lab Results   Component Value Date    HDL 55 06/01/2021     Lab Results   Component Value Date    LDLCALC 69 06/01/2021     Lab Results   Component Value Date    TRIG 78 06/01/2021       HgbA1c: No results found for: HGBA1C    Blood Culture: No results found for: BLOODCX,   Urinalysis:   Lab Results   Component Value Date    COLORU yellow 01/19/2023    COLORU Yellow 09/21/2021    CLARITYU clear 01/19/2023    CLARITYU Clear 09/21/2021    SPECGRAV 1 015 09/21/2021    PHUR 6 5 09/21/2021    LEUKOCYTESUR - 01/19/2023    LEUKOCYTESUR Trace (A) 09/21/2021    NITRITE - 01/19/2023    NITRITE Negative 09/21/2021    GLUCOSEU normal 01/19/2023    GLUCOSEU Negative 09/21/2021    KETONESU - 01/19/2023    KETONESU Negative 09/21/2021    BILIRUBINUR - 01/19/2023    BILIRUBINUR Negative 09/21/2021    BLOODU trace 01/19/2023    BLOODU Negative 09/21/2021   ,   Urine Culture:   Lab Results   Component Value Date    URINECX >100,000 cfu/ml Proteus mirabilis (A) 09/21/2021   ,   Wound Culure:  No results found for: WOUNDCULT      Thank you for allowing me to participate in the care of Mary Sick  Please don't hesitate to call, text, email, or TigerText with any questions  This text is generated with voice recognition software  There may be translation, syntax,  or grammatical errors  If you have any questions, please contact the dictating provider

## 2023-03-08 NOTE — PROGRESS NOTES
[address] [facility]  Progress Note - Bill Liu 1995, 28 y o  female MRN: [id number]  Unit/Bed#: [facility] 906-01 Encounter: [id number]  Primary Care Provider: Amelia Smith MD   Date and time admitted to hospital: 3/6/2023  2:12 PM    Muscle cramping  Assessment & Plan  -pt with c/o bilateral leg cramping, headache dizziness in context of elective uterine artery embolization on 3/6  -suspect 2/2 dehydration as pt reports she did not eat/drink anything prior to procedure and has not had much to eat or drink since   -CMP: elevated Cl to 112, otherwise electrolytes WNL    Plan:   · Encouraged PO fluid intake  · NS @ 100cc/hr      Dizziness  Assessment & Plan  -pt with complaint of dizziness s/p elective uterine artery embolization for fibroids on 3/6; suspect 2/2 dehydration as pt reports she was not able to eat or drink prior to the procedure and has not been eating/drinking much since the procedure  -dizziness initially suspected to be caused by toradol given post op, as pt is opioid naive, however dizziness did not resolve after discontinuation of toradol, prompting IR transfer to [facility] on 3/07  -workup for dizziness completed by IR included: ECG on 3/6 (unremarkable, NSR @ 96), CMP on 3/6 (showing elevated Cl @ 110, otherwise electrolytes and kidney fxn WNL)  -no focal deficits appreciated on neuro exam, pt with difficulty moving legs 2/2 cramping   -CBC shows WBC 11 96 (to be expected s/p procedure per IR), otherwise WNL, CMP shows Cl of 112, otherwise WNL    Plan:  · NS at 100cc/hr, encouraged PO hydration   · Check I&O's      * Dysfunctional uterine bleeding  Assessment & Plan  -s/p elective uterine artery embolization on 3/6; pt tolerated procedure well with no complications    -medically clear from IR's perspective, initially planned for d/c on 3/7 but transferred to [facility] given sx of dizziness, weakness      Plan:  · Current pain regimen: ibuprofen 600mg q8h scheduled, percocet 5-325 q6h prn breakthrough pain  · Monitor     Subjective:   No acute events overnight, pt seen and examined at bedside  States she is still having intense lower abdominal pain despite the ibuprofen and percocet  Cramping in legs and headache have subsided  She was able to walk to the bathroom yesterday evening with minimal pain  States abdominal pain was aggravated last night when she attempted to have a bowel movement from all the straining  Reports she has been constipated  Objective:     Vitals: Blood pressure 112/70, pulse 71, temperature 97 5 °F (36 4 °C), resp  rate 19, height 5' 3" (1 6 m), weight 76 2 kg (168 lb), SpO2 97 %  ,Body mass index is 29 76 kg/m²  Intake/Output Summary (Last 24 hours) at 3/8/2023 0756  Last data filed at 3/7/2023 1800  Gross per 24 hour   Intake 640 ml   Output 500 ml   Net 140 ml       Physical Exam:   Physical Exam  Constitutional:       General: She is not in acute distress  Appearance: Normal appearance  She is not ill-appearing or toxic-appearing  HENT:      Head: Normocephalic and atraumatic  Right Ear: External ear normal       Left Ear: External ear normal       Nose: Nose normal       Mouth/Throat:      Mouth: Mucous membranes are moist       Pharynx: Oropharynx is clear  Eyes:      General: No scleral icterus  Conjunctiva/sclera: Conjunctivae normal    Cardiovascular:      Rate and Rhythm: Normal rate and regular rhythm  Heart sounds: Normal heart sounds  No murmur heard  Pulmonary:      Effort: Pulmonary effort is normal       Breath sounds: Normal breath sounds  No wheezing, rhonchi or rales  Abdominal:      General: Bowel sounds are normal       Tenderness: There is abdominal tenderness  There is guarding  Musculoskeletal:         General: No swelling or tenderness  Normal range of motion  Cervical back: Neck supple  Right lower leg: No edema  Left lower leg: No edema     Skin:     General: Skin is warm and dry       Coloration: Skin is not jaundiced  Neurological:      General: No focal deficit present  Mental Status: She is alert and oriented to person, place, and time  Cranial Nerves: Cranial nerve deficit present  Motor: No weakness  Psychiatric:         Mood and Affect: Mood normal          Behavior: Behavior normal          Invasive Devices     Peripheral Intravenous Line  Duration           Peripheral IV 03/07/23 Dorsal (posterior); Right Hand <1 day                           Lab and other studies:  I have personally reviewed pertinent reports       Admission on 03/06/2023   Component Date Value   • WBC 03/06/2023 6 56    • RBC 03/06/2023 4 41    • Hemoglobin 03/06/2023 13 8    • Hematocrit 03/06/2023 43 1    • MCV 03/06/2023 98    • MCH 03/06/2023 31 3    • MCHC 03/06/2023 32 0    • RDW 03/06/2023 12 1    • Platelets 10/02/5868 338    • MPV 03/06/2023 9 8    • Protime 03/06/2023 13 3    • INR 03/06/2023 0 99    • Sodium 03/06/2023 137    • Potassium 03/06/2023 3 8    • Chloride 03/06/2023 110 (H)    • CO2 03/06/2023 25    • ANION GAP 03/06/2023 2 (L)    • BUN 03/06/2023 10    • Creatinine 03/06/2023 0 81    • Glucose 03/06/2023 100    • Glucose, Fasting 03/06/2023 100 (H)    • Calcium 03/06/2023 9 0    • eGFR 03/06/2023 99    • Preg, Serum 03/06/2023 Negative    • WBC 03/06/2023 10 22 (H)    • RBC 03/06/2023 4 44    • Hemoglobin 03/06/2023 14 1    • Hematocrit 03/06/2023 42 4    • MCV 03/06/2023 96    • MCH 03/06/2023 31 8    • MCHC 03/06/2023 33 3    • RDW 03/06/2023 11 9    • MPV 03/06/2023 9 8    • Platelets 64/45/0939 355    • nRBC 03/06/2023 0    • Neutrophils Relative 03/06/2023 89 (H)    • Immat GRANS % 03/06/2023 0    • Lymphocytes Relative 03/06/2023 10 (L)    • Monocytes Relative 03/06/2023 1 (L)    • Eosinophils Relative 03/06/2023 0    • Basophils Relative 03/06/2023 0    • Neutrophils Absolute 03/06/2023 9 13 (H)    • Immature Grans Absolute 03/06/2023 0 04    • Lymphocytes Absolute 03/06/2023 0 99    • Monocytes Absolute 03/06/2023 0 05 (L)    • Eosinophils Absolute 03/06/2023 0 00    • Basophils Absolute 03/06/2023 0 01    • Sodium 03/06/2023 136    • Potassium 03/06/2023 3 7    • Chloride 03/06/2023 110 (H)    • CO2 03/06/2023 21    • ANION GAP 03/06/2023 5    • BUN 03/06/2023 11    • Creatinine 03/06/2023 0 77    • Glucose 03/06/2023 106    • Calcium 03/06/2023 8 8    • eGFR 03/06/2023 105    • Magnesium 03/06/2023 2 1    • LACTIC ACID 03/06/2023 1 7    • Ventricular Rate 03/06/2023 96    • Atrial Rate 03/06/2023 96    • AZ Interval 03/06/2023 138    • QRSD Interval 03/06/2023 86    • QT Interval 03/06/2023 374    • QTC Interval 03/06/2023 472    • P Axis 03/06/2023 70    • QRS Axis 03/06/2023 61    • T Wave Axis 03/06/2023 61    • Ventricular Rate 03/06/2023 91    • Atrial Rate 03/06/2023 91    • AZ Interval 03/06/2023 136    • QRSD Interval 03/06/2023 86    • QT Interval 03/06/2023 372    • QTC Interval 03/06/2023 457    • P Axis 03/06/2023 70    • QRS Axis 03/06/2023 61    • T Wave Axis 03/06/2023 61    • WBC 03/07/2023 11 96 (H)    • RBC 03/07/2023 3 77 (L)    • Hemoglobin 03/07/2023 11 8    • Hematocrit 03/07/2023 36 1    • MCV 03/07/2023 96    • MCH 03/07/2023 31 3    • MCHC 03/07/2023 32 7    • RDW 03/07/2023 12 2    • Platelets 65/64/3865 292    • MPV 03/07/2023 9 7    • Sodium 03/07/2023 138    • Potassium 03/07/2023 3 6    • Chloride 03/07/2023 112 (H)    • CO2 03/07/2023 23    • ANION GAP 03/07/2023 3 (L)    • BUN 03/07/2023 14    • Creatinine 03/07/2023 0 70    • Glucose 03/07/2023 137    • Calcium 03/07/2023 8 3    • Corrected Calcium 03/07/2023 9 3    • AST 03/07/2023 17    • ALT 03/07/2023 14    • Alkaline Phosphatase 03/07/2023 30 (L)    • Total Protein 03/07/2023 6 3 (L)    • Albumin 03/07/2023 2 8 (L)    • Total Bilirubin 03/07/2023 0 26    • eGFR 03/07/2023 118        Recent Results (from the past 24 hour(s))   CBC and Platelet    Collection Time: 03/07/23  5:05 PM   Result Value Ref Range    WBC 11 96 (H) 4 31 - 10 16 Thousand/uL    RBC 3 77 (L) 3 81 - 5 12 Million/uL    Hemoglobin 11 8 11 5 - 15 4 g/dL    Hematocrit 36 1 34 8 - 46 1 %    MCV 96 82 - 98 fL    MCH 31 3 26 8 - 34 3 pg    MCHC 32 7 31 4 - 37 4 g/dL    RDW 12 2 11 6 - 15 1 %    Platelets 766 893 - 622 Thousands/uL    MPV 9 7 8 9 - 12 7 fL   Comprehensive metabolic panel    Collection Time: 03/07/23  5:05 PM   Result Value Ref Range    Sodium 138 135 - 147 mmol/L    Potassium 3 6 3 5 - 5 3 mmol/L    Chloride 112 (H) 96 - 108 mmol/L    CO2 23 21 - 32 mmol/L    ANION GAP 3 (L) 4 - 13 mmol/L    BUN 14 5 - 25 mg/dL    Creatinine 0 70 0 60 - 1 30 mg/dL    Glucose 137 65 - 140 mg/dL    Calcium 8 3 8 3 - 10 1 mg/dL    Corrected Calcium 9 3 8 3 - 10 1 mg/dL    AST 17 5 - 45 U/L    ALT 14 12 - 78 U/L    Alkaline Phosphatase 30 (L) 46 - 116 U/L    Total Protein 6 3 (L) 6 4 - 8 4 g/dL    Albumin 2 8 (L) 3 5 - 5 0 g/dL    Total Bilirubin 0 26 0 20 - 1 00 mg/dL    eGFR 118 ml/min/1 73sq m     Blood Culture: No results found for: BLOODCX,   Urinalysis:   Lab Results   Component Value Date    COLORU yellow 01/19/2023    COLORU Yellow 09/21/2021    CLARITYU clear 01/19/2023    CLARITYU Clear 09/21/2021    SPECGRAV 1 015 09/21/2021    PHUR 6 5 09/21/2021    LEUKOCYTESUR - 01/19/2023    LEUKOCYTESUR Trace (A) 09/21/2021    NITRITE - 01/19/2023    NITRITE Negative 09/21/2021    GLUCOSEU normal 01/19/2023    GLUCOSEU Negative 09/21/2021    KETONESU - 01/19/2023    KETONESU Negative 09/21/2021    BILIRUBINUR - 01/19/2023    BILIRUBINUR Negative 09/21/2021    BLOODU trace 01/19/2023    BLOODU Negative 09/21/2021   ,   Urine Culture:   Lab Results   Component Value Date    URINECX >100,000 cfu/ml Proteus mirabilis (A) 09/21/2021   ,   Wound Culure: No results found for: WOUNDCULT      Imaging:  None       VTE Pharmacologic Prophylaxis: Reason for no pharmacologic prophylaxis s/p uterine artery embolization, pt is ambulatory  VTE Mechanical Prophylaxis: sequential compression device and foot pump applied    Current Facility-Administered Medications   Medication Dose Route Frequency   • ibuprofen (MOTRIN) tablet 600 mg  600 mg Oral Q8H [address]   • naloxone (NARCAN) 0 04 mg/mL syringe 0 04 mg  0 04 mg Intravenous Q3 min PRN   • ondansetron (ZOFRAN) injection 4 mg  4 mg Intravenous Q6H PRN   • oxyCODONE-acetaminophen (PERCOCET) 5-325 mg per tablet 1 tablet  1 tablet Oral Q6H PRN   • sodium chloride 0 9 % infusion  100 mL/hr Intravenous Continuous           Diet: regular house  Code Status: level 1 full code  Dispo: discharge to home    Plan D/W Dr Garcia and [facility] Team      Alycia Martinez, [address]  [facility] Resident PGY2

## 2023-03-09 ENCOUNTER — TELEPHONE (OUTPATIENT)
Dept: INTERVENTIONAL RADIOLOGY/VASCULAR | Facility: CLINIC | Age: 28
End: 2023-03-09

## 2023-03-09 VITALS
RESPIRATION RATE: 18 BRPM | DIASTOLIC BLOOD PRESSURE: 85 MMHG | BODY MASS INDEX: 29.77 KG/M2 | HEIGHT: 63 IN | WEIGHT: 168 LBS | OXYGEN SATURATION: 98 % | TEMPERATURE: 97.2 F | HEART RATE: 83 BPM | SYSTOLIC BLOOD PRESSURE: 120 MMHG

## 2023-03-09 DIAGNOSIS — D21.9 FIBROIDS: Primary | ICD-10-CM

## 2023-03-09 PROBLEM — R21 RASH: Status: ACTIVE | Noted: 2023-03-09

## 2023-03-09 LAB
ANION GAP SERPL CALCULATED.3IONS-SCNC: 7 MMOL/L (ref 4–13)
BASOPHILS # BLD AUTO: 0.03 THOUSANDS/ÂΜL (ref 0–0.1)
BASOPHILS NFR BLD AUTO: 0 % (ref 0–1)
BUN SERPL-MCNC: 12 MG/DL (ref 5–25)
CALCIUM SERPL-MCNC: 8.2 MG/DL (ref 8.3–10.1)
CHLORIDE SERPL-SCNC: 110 MMOL/L (ref 96–108)
CO2 SERPL-SCNC: 22 MMOL/L (ref 21–32)
CREAT SERPL-MCNC: 0.73 MG/DL (ref 0.6–1.3)
EOSINOPHIL # BLD AUTO: 0.1 THOUSAND/ÂΜL (ref 0–0.61)
EOSINOPHIL NFR BLD AUTO: 1 % (ref 0–6)
ERYTHROCYTE [DISTWIDTH] IN BLOOD BY AUTOMATED COUNT: 12 % (ref 11.6–15.1)
GFR SERPL CREATININE-BSD FRML MDRD: 112 ML/MIN/1.73SQ M
GLUCOSE SERPL-MCNC: 116 MG/DL (ref 65–140)
HCT VFR BLD AUTO: 36.2 % (ref 34.8–46.1)
HGB BLD-MCNC: 11.8 G/DL (ref 11.5–15.4)
IMM GRANULOCYTES # BLD AUTO: 0.06 THOUSAND/UL (ref 0–0.2)
IMM GRANULOCYTES NFR BLD AUTO: 0 % (ref 0–2)
LYMPHOCYTES # BLD AUTO: 2.27 THOUSANDS/ÂΜL (ref 0.6–4.47)
LYMPHOCYTES NFR BLD AUTO: 16 % (ref 14–44)
MCH RBC QN AUTO: 31.7 PG (ref 26.8–34.3)
MCHC RBC AUTO-ENTMCNC: 32.6 G/DL (ref 31.4–37.4)
MCV RBC AUTO: 97 FL (ref 82–98)
MONOCYTES # BLD AUTO: 1.62 THOUSAND/ÂΜL (ref 0.17–1.22)
MONOCYTES NFR BLD AUTO: 12 % (ref 4–12)
NEUTROPHILS # BLD AUTO: 9.88 THOUSANDS/ÂΜL (ref 1.85–7.62)
NEUTS SEG NFR BLD AUTO: 71 % (ref 43–75)
NRBC BLD AUTO-RTO: 0 /100 WBCS
PLATELET # BLD AUTO: 299 THOUSANDS/UL (ref 149–390)
PMV BLD AUTO: 10.3 FL (ref 8.9–12.7)
POTASSIUM SERPL-SCNC: 3.5 MMOL/L (ref 3.5–5.3)
RBC # BLD AUTO: 3.72 MILLION/UL (ref 3.81–5.12)
SODIUM SERPL-SCNC: 139 MMOL/L (ref 135–147)
WBC # BLD AUTO: 13.96 THOUSAND/UL (ref 4.31–10.16)

## 2023-03-09 RX ORDER — OMEPRAZOLE 20 MG/1
20 CAPSULE, DELAYED RELEASE ORAL EVERY MORNING
Qty: 7 CAPSULE | Refills: 0 | Status: SHIPPED | OUTPATIENT
Start: 2023-03-09 | End: 2023-03-16

## 2023-03-09 RX ORDER — IBUPROFEN 600 MG/1
600 TABLET ORAL EVERY 8 HOURS PRN
Qty: 30 TABLET | Refills: 0 | Status: SHIPPED | OUTPATIENT
Start: 2023-03-09

## 2023-03-09 RX ORDER — METHYLPREDNISOLONE 4 MG/1
TABLET ORAL
Qty: 21 TABLET | Refills: 0 | Status: SHIPPED | OUTPATIENT
Start: 2023-03-09

## 2023-03-09 RX ADMIN — OXYCODONE HYDROCHLORIDE 5 MG: 5 TABLET ORAL at 10:43

## 2023-03-09 RX ADMIN — OXYCODONE HYDROCHLORIDE 5 MG: 5 TABLET ORAL at 05:43

## 2023-03-09 RX ADMIN — ACETAMINOPHEN 650 MG: 325 TABLET ORAL at 08:27

## 2023-03-09 RX ADMIN — DOCUSATE SODIUM 100 MG: 100 CAPSULE, LIQUID FILLED ORAL at 08:27

## 2023-03-09 RX ADMIN — KETOROLAC TROMETHAMINE 15 MG: 30 INJECTION, SOLUTION INTRAMUSCULAR; INTRAVENOUS at 08:27

## 2023-03-09 RX ADMIN — CEFAZOLIN SODIUM 2000 MG: 2 SOLUTION INTRAVENOUS at 04:45

## 2023-03-09 NOTE — PROGRESS NOTES
Progress Note -Interventional Radiology  Gomez Evans 28 y o  female MRN: [id number]  Unit/Bed#: [facility] 906-01 Encounter: [id number]    Assessment/Plan:  Patient is a 27 y/o female with a past medical history of dysfunctional uterine bleeding secondary to fibroids who presented for uterine artery embolization on 3/6, which was technically successful with no immediate complications  POD#1 she had a some nausea and vomiting, chest pain, SOB, difficulty ambulating d/t numbness in her legs, nicole removed in the AM, dizziness and extreme abdominal pain  Patient was transferred to [facility] medicine service for further evaluation of symptoms  POD#2 patient with improvement of abdominal pain, but found to have erythematous, painful, swollen are on her buttock  Family medicine treated with hydrocortisone, IV Ancef Q8 hours  Patient's pain regimen was also adjusted to include Toradol, tylenol, and oxycodone  POD#3: Overall patient reports feeling better  She notes better improvement of abdominal pain with IV Toradol and oxycodone compared to Tylenol  She notes improvement of buttock pain and swelling, but still with erythema  Patient has been able to ambulate, tolerate p o  and urinate without issue  Patient has not yet had a BM, but is passing gas  Patient offers no other complaints and notes complete resolution of dizziness, leg cramps   Right groin site pain also improving still with mild TTP     -WBC 13 96 today, expected after [place], Hgb stable, 11 8, vitals stable  -Pain is as expected post procedure  -Anticipated discharge today with overall improvement, dipso per [facility] Medicine  -Continue pain regimen, pain regimen and bowel regimen orders placed for discharge   -Continue treatment of buttock rash per primary team, plan for discharge with steroid taper  -Continue groin site management as ordered  -Patient to follow-up with IR for post-op clinic visit, orders will be placed  -Plan discussed with Family medicine and Dr Nelida Belcher    Patient Active Problem List   Diagnosis   • Left foot pain   • Overweight (BMI 25 0-29  9)   • Dysfunctional uterine bleeding   • Dizziness   • Muscle cramping   • Rash          Subjective: Марина Paniagua is a 28 y o  female who presented with a past medical history of dysfunctional uterine bleeding secondary to fibroids who presented for uterine artery embolization today, which was technically successful with no immediate complications  Patient now POD#3 with overall improvement of symptoms, though still with abdominal pain and cramping, that intensifies as pain medication wears off  Patient notes complete resolution of dizziness, leg cramps  Patient has been able to ambulate without issue, tolerating p o  and urinating  Patient has not had a bowel movement, but is passing gas  Patient denies any fevers, chills, headache, dizziness, chest pain, shortness of breath, nausea, vomiting, leg pain, numbness, tingling, weakness  Objective:    Vitals:  /85   Pulse 83   Temp (!) 97 2 °F (36 2 °C)   Resp 18   Ht 5' 3" (1 6 m)   Wt 76 2 kg (168 lb)   LMP  (Approximate) Comment: 4 months ago  SpO2 98%   BMI 29 76 kg/m²   Body mass index is 29 76 kg/m²  Weight (last 2 days)     None          I/Os:  No intake or output data in the 24 hours ending 03/09/23 1225    Invasive Devices     None                 Physical Exam:  /85   Pulse 83   Temp (!) 97 2 °F (36 2 °C)   Resp 18   Ht 5' 3" (1 6 m)   Wt 76 2 kg (168 lb)   LMP  (Approximate) Comment: 4 months ago  SpO2 98%   BMI 29 76 kg/m²   General appearance: alert and oriented, in no acute distress  Head: Normocephalic, without obvious abnormality  Lungs: clear to auscultation bilaterally  Heart: regular rate and rhythm  Abdomen: Abdomen is soft, normoactive bowel sounds  Tenderness to palpation across lower abdomen, maximal in the suprapubic region  No peritoneal signs noted    Extremities: extremities normal, warm and well-perfused; no cyanosis, clubbing, or edema and Right groin site with no surrounding swelling, erythema, ecchymosis or hematoma noted  Mild tenderness to palpation  Skin: Buttock erythema with worse on the left buttock compared to right buttock, tender to palpation, though soft, mild warmth noted  No abscess or skin breaks noted  Lab Results and Cultures:   CBC with diff:   Lab Results   Component Value Date    WBC 13 96 (H) 03/09/2023    HGB 11 8 03/09/2023    HCT 36 2 03/09/2023    MCV 97 03/09/2023     03/09/2023    MCH 31 7 03/09/2023    MCHC 32 6 03/09/2023    RDW 12 0 03/09/2023    MPV 10 3 03/09/2023    NRBC 0 03/09/2023      BMP/CMP:  Lab Results   Component Value Date    K 3 5 03/09/2023     (H) 03/09/2023    CO2 22 03/09/2023    BUN 12 03/09/2023    CREATININE 0 73 03/09/2023    CALCIUM 8 2 (L) 03/09/2023    AST 17 03/07/2023    ALT 14 03/07/2023    ALKPHOS 30 (L) 03/07/2023    EGFR 112 03/09/2023   ,     Coags:   Lab Results   Component Value Date    INR 0 99 03/06/2023   ,   Results from last 7 days   Lab Units 03/06/23  0920   INR  0 99        Lipid Panel: No results found for: CHOL  Lab Results   Component Value Date    HDL 55 06/01/2021     Lab Results   Component Value Date    HDL 55 06/01/2021     Lab Results   Component Value Date    LDLCALC 69 06/01/2021     Lab Results   Component Value Date    TRIG 78 06/01/2021       HgbA1c: No results found for: HGBA1C    Blood Culture:   Lab Results   Component Value Date    BLOODCX Received in Microbiology Lab  Culture in Progress  03/08/2023    BLOODCX Received in Microbiology Lab  Culture in Progress   03/08/2023   ,   Urinalysis:   Lab Results   Component Value Date    COLORU yellow 01/19/2023    COLORU Yellow 09/21/2021    CLARITYU clear 01/19/2023    CLARITYU Clear 09/21/2021    SPECGRAV 1 015 09/21/2021    PHUR 6 5 09/21/2021    LEUKOCYTESUR - 01/19/2023    LEUKOCYTESUR Trace (A) 09/21/2021    NITRITE - 01/19/2023 NITRITE Negative 09/21/2021    GLUCOSEU normal 01/19/2023    GLUCOSEU Negative 09/21/2021    KETONESU - 01/19/2023    KETONESU Negative 09/21/2021    BILIRUBINUR - 01/19/2023    BILIRUBINUR Negative 09/21/2021    BLOODU trace 01/19/2023    BLOODU Negative 09/21/2021   ,   Urine Culture:   Lab Results   Component Value Date    URINECX >100,000 cfu/ml Proteus mirabilis (A) 09/21/2021   ,   Wound Culure:  No results found for: [facility]         Thank you for allowing me to participate in the care of Ese Sherman  Please don't hesitate to call, text, email, or TigerText with any questions  This text is generated with voice recognition software  There may be translation, syntax,  or grammatical errors  If you have any questions, please contact the dictating provider

## 2023-03-09 NOTE — PROGRESS NOTES
PROGRESS NOTE - Family Medicine Residency Mike hay 1995, 28 y o  female  MRN: [id number]    Unit/Bed#: [facility] 906-01 Encounter: [id number]  Primary Care Provider: Isabel Vu MD      Admission Date: 3/6/2023  Length of Stay: 1 days  Code Status:  Level 1 - Full Code  Diet: Diet Regular; Regular House  Consult:   None      Assessment & Plan:     Discussed with [facility] team and finalization is pending attending physician attestation      Rash  Assessment & Plan  -improving  -BL, pruritic and tender gluteal rash discovered on 3/8   -Itchy, ddx includes cellulitis or heat rash, allergic reaction    Plan:   · Treat for cellulitis with IV abx, potential allergic reaction with benadryl prn  · IV ancef q8, will consider switching to oral abx     Muscle cramping  Assessment & Plan  -now resolved   -pt with c/o bilateral leg cramping, headache dizziness in context of elective uterine artery embolization on 3/6  -suspect 2/2 dehydration as pt reports she did not eat/drink anything prior to procedure and has not had much to eat or drink since   -CMP: elevated Cl to 112, otherwise electrolytes WNL    Plan:   · Encouraged PO fluid intake  · NS @ 100cc/hr      Dizziness  Assessment & Plan  -improving  -pt with complaint of dizziness s/p elective uterine artery embolization for fibroids on 3/6; suspect 2/2 dehydration as pt reports she was not able to eat or drink prior to the procedure and has not been eating/drinking much since the procedure  -dizziness initially suspected to be caused by toradol given post op, as pt is opioid naive, however dizziness did not resolve after discontinuation of toradol, prompting IR transfer to [facility] on 3/07  -workup for dizziness completed by IR included: ECG on 3/6 (unremarkable, NSR @ 96), CMP on 3/6 (showing elevated Cl @ 110, otherwise electrolytes and kidney fxn WNL)  -no focal deficits appreciated on neuro exam, pt with difficulty moving legs 2/2 cramping   -CBC shows WBC 11 96 (to be expected s/p procedure per IR), otherwise WNL, CMP shows Cl of 112, otherwise WNL    Plan:  · NS at 100cc/hr, encouraged PO hydration   · Check I&O's      * Dysfunctional uterine bleeding  Assessment & Plan  -s/p elective uterine artery embolization on 3/6; pt tolerated procedure well with no complications    -medically clear from IR's perspective, initially planned for d/c on 3/7 but transferred to [facility] given sx of dizziness, weakness    -IR saw pt at bedside on 3/8 for abdominal pain, transitioned pain regimen from ibuprofen and percocet to toradol TID, with tylenol and valentin 2 5/5 ordered as prns      Plan:  · Current pain regimen: toradol TID, prns: tylenol, valentin  · Monitor       Principal Problem:    Dysfunctional uterine bleeding  Active Problems:    Dizziness    Muscle cramping    Rash      VTE Pharm PPX: Reason for no pharmacologic prophylaxis patient is ambulatory  VTE Mech PPX: sequential compression device and/or foot pump applied unless otherwise contraindicated    Subjective     Hospital Course & 24hr events:     Hospital course:  28 y o  female admitted 3/6/2023, now HD# 1, for intractable pain s/p uterine artery embolization on 3/6, which was successful with no immediate complications    POD #1 patient noted overall improvement of abdominal and groin pain, though still with some lower abdominal pain  Reported nausea and one episode of vomiting this morning, which improved with Zofran and patient able to tolerate breakfast  Patient reported constant dizziness overnight, which she describes as not being able to focus   She denied any associated double vision, numbness, tingling, weakness  Patient also noted intermittent chest pain and shortness of breath, which she attributed to anxiety attacks in which she becomes nervous when she attempts to fall asleep   Patient was reassessed in the afternoon of POD#1 and she was urinating without difficulty and had relief with thigh pain after Percocet  Patient described bilateral leg tingling, similar to legs falling asleep   Patient unable to hold legs against gravity bilaterally on assessment, though poor effort with exam  She was admitted to the [facility] team for observation overnight for continued dizziness and subjective weakness  Overnight/24hr events:  Overnight events: none  Concerns per nursing staff: none  Patient seen and examined at bedside  Reports the muscle cramping and dizziness is much improved since being on IV fluids, is now almost completely resolved  Still experiencing abdominal pain and pain around her buttocks where the rash is present  Does endorse dome improvement in buttock pain since initiation of IV abx  Review of Systems:     Review of Systems   Constitutional: Negative for chills and fever  Eyes: Negative for visual disturbance  Respiratory: Negative for cough and shortness of breath  Cardiovascular: Negative for chest pain and palpitations  Gastrointestinal: Negative for abdominal pain and vomiting  Genitourinary: Negative for dysuria and hematuria  Musculoskeletal: Positive for myalgias (cramping b/l legs)  Negative for arthralgias and back pain  Skin: Negative for color change and rash  Neurological: Negative for dizziness, weakness and headaches  All other systems reviewed and are negative          Objective     Vitals:     Vitals:    03/08/23 0712 03/08/23 1526 03/08/23 2200 03/09/23 0746   BP: 112/70 122/79 124/79 120/85   Pulse: 71 68 70 83   Resp: 19  18 18   Temp: 97 5 °F (36 4 °C) (!) 97 3 °F (36 3 °C) (!) 97 2 °F (36 2 °C) (!) 97 2 °F (36 2 °C)   TempSrc:       SpO2: 97% 98% 98% 98%   Weight:       Height:         Temp:  [97 2 °F (36 2 °C)-97 3 °F (36 3 °C)] 97 2 °F (36 2 °C)  HR:  [68-83] 83  Resp:  [18] 18  BP: (120-124)/(79-85) 120/85  Weight (last 2 days)     None        No intake or output data in the 24 hours ending 03/09/23 0825  Invasive Devices     Peripheral Intravenous Line Duration           Peripheral IV 03/07/23 Dorsal (posterior); Right Hand 1 day                  Labs: I have personally reviewed pertinent reports  Results from last 7 days   Lab Units 03/09/23  0526 03/07/23  1705 03/06/23  1806 03/06/23  0920   WBC Thousand/uL 13 96* 11 96* 10 22* 6 56   HEMOGLOBIN g/dL 11 8 11 8 14 1 13 8   HEMATOCRIT % 36 2 36 1 42 4 43 1   PLATELETS Thousands/uL 299 292 355 338   NEUTROS ABS Thousands/µL 9 88*  --  9 13*  --        Results from last 7 days   Lab Units 03/09/23  0526 03/07/23  1705 03/06/23  1806 03/06/23  0920   POTASSIUM mmol/L 3 5 3 6 3 7 3 8   CHLORIDE mmol/L 110* 112* 110* 110*   CO2 mmol/L 22 23 21 25   BUN mg/dL 12 14 11 10   CREATININE mg/dL 0 73 0 70 0 77 0 81   CALCIUM mg/dL 8 2* 8 3 8 8 9 0   AST U/L  --  17  --   --    ALT U/L  --  14  --   --    ALK PHOS U/L  --  30*  --   --    EGFR ml/min/1 73sq m 112 118 105 99   MAGNESIUM mg/dL  --   --  2 1  --        Results from last 7 days   Lab Units 03/06/23  1806   LACTIC ACID mmol/L 1 7       Lab Results   Component Value Date/Time    Blood Culture Received in Microbiology Lab  Culture in Progress  03/08/2023 01:53 PM    Blood Culture Received in Microbiology Lab  Culture in Progress  03/08/2023 01:53 PM         EKG, Pathology, Imaging, and Other Studies:   I have personally reviewed pertinent reports  IR uterine artery embolization    Result Date: 3/7/2023  Impression: Impression: Uterine artery embolization  Two dominant fibroids predominately supplied by the left uterine artery   Workstation performed: [id number]         Meds/Allergies     All medications and allergies reviewed  No Known Allergies    Continuous Infusions:  sodium chloride, 100 mL/hr, Last Rate: 100 mL/hr (03/08/23 2220)        Scheduled Meds:  Current Facility-Administered Medications   Medication Dose Route Frequency Provider Last Rate   • acetaminophen  650 mg Oral Q6H PRN Valencia Malagon MD     • cefazolin  2,000 mg Intravenous Q8H Una Cartagena MD 2,000 mg (03/09/23 8299)   • diphenhydrAMINE  50 mg Intravenous Q6H PRN Anju Rehman MD     • docusate sodium  100 mg Oral BID Una Cartagena MD     • ketorolac  15 mg Intravenous TID Una Cartagena MD     • naloxone  0 04 mg Intravenous Q3 min PRN Jackie Orr MD     • ondansetron  4 mg Intravenous Q6H PRN Jackie Orr MD     • oxyCODONE  2 5 mg Oral Q4H PRN Una Cartagena MD     • oxyCODONE  5 mg Oral Q4H PRN Una Cartagena MD     • sodium chloride  100 mL/hr Intravenous Continuous Jillyn Specking,  mL/hr (03/08/23 2220)       PRN Meds:  •  acetaminophen  •  diphenhydrAMINE  •  naloxone  •  ondansetron  •  oxyCODONE  •  oxyCODONE      Physical Exam   Physical Exam  Constitutional:       General: She is not in acute distress  Appearance: Normal appearance  She is not ill-appearing or toxic-appearing  HENT:      Head: Normocephalic and atraumatic  Right Ear: External ear normal       Left Ear: External ear normal       Nose: Nose normal       Mouth/Throat:      Mouth: Mucous membranes are moist       Pharynx: Oropharynx is clear  Eyes:      General: No scleral icterus  Conjunctiva/sclera: Conjunctivae normal    Cardiovascular:      Rate and Rhythm: Normal rate and regular rhythm  Heart sounds: Normal heart sounds  No murmur heard  Pulmonary:      Effort: Pulmonary effort is normal       Breath sounds: Normal breath sounds  No wheezing, rhonchi or rales  Abdominal:      General: Bowel sounds are normal       Tenderness: There is abdominal tenderness  There is guarding  Musculoskeletal:         General: No swelling or tenderness  Normal range of motion  Cervical back: Neck supple  Right lower leg: No edema  Left lower leg: No edema  Skin:     General: Skin is warm and dry  Coloration: Skin is not jaundiced  Findings: Erythema and rash (well demarcated erythematous rash on left medical buttock, with extension to right medial buttock   Tender to palpation) present  Neurological:      General: No focal deficit present  Mental Status: She is alert and oriented to person, place, and time  Cranial Nerves: No cranial nerve deficit  Motor: No weakness     Psychiatric:         Mood and Affect: Mood normal          Behavior: Behavior normal               Augusta López DO  PGY-2, [facility]  03/09/23, 8:25 AM

## 2023-03-09 NOTE — ASSESSMENT & PLAN NOTE
-improving  -pt with complaint of dizziness s/p elective uterine artery embolization for fibroids on 3/6; suspect 2/2 dehydration as pt reports she was not able to eat or drink prior to the procedure and has not been eating/drinking much since the procedure  -dizziness initially suspected to be caused by toradol given post op, as pt is opioid naive, however dizziness did not resolve after discontinuation of toradol, prompting IR transfer to [facility] on 3/07  -workup for dizziness completed by IR included: ECG on 3/6 (unremarkable, NSR @ 96), CMP on 3/6 (showing elevated Cl @ 110, otherwise electrolytes and kidney fxn WNL)  -no focal deficits appreciated on neuro exam, pt with difficulty moving legs 2/2 cramping   -CBC shows WBC 11 96 (to be expected s/p procedure per IR), otherwise WNL, CMP shows Cl of 112, otherwise WNL    Plan:  · NS at 100cc/hr, encouraged PO hydration   · Check I&O's

## 2023-03-09 NOTE — ASSESSMENT & PLAN NOTE
-now resolved   -pt with c/o bilateral leg cramping, headache dizziness in context of elective uterine artery embolization on 3/6  -suspect 2/2 dehydration as pt reports she did not eat/drink anything prior to procedure and has not had much to eat or drink since   -CMP: elevated Cl to 112, otherwise electrolytes WNL    Plan:   · Encouraged PO fluid intake  · NS @ 100cc/hr

## 2023-03-09 NOTE — ASSESSMENT & PLAN NOTE
-improving  -BL, pruritic and tender gluteal rash discovered on 3/8   -Itchy, ddx includes cellulitis or heat rash, allergic reaction    Plan:   · Treat for cellulitis with IV abx, potential allergic reaction with benadryl prn  · IV ancef q8, will consider switching to oral abx   · Blood cx pending

## 2023-03-09 NOTE — UTILIZATION REVIEW
NOTIFICATION OF INPATIENT ADMISSION   AUTHORIZATION REQUEST   SERVICING FACILITY:   [facility]  Address: [address] [place] [address], [address] [place] [address]  Tax ID: [id number]  NPI: [id number] ATTENDING PROVIDER:  Attending Name and NPI#: Altagracia Ramirez Md [[id number]]  Address: [address] [place] [address]  Phone: [phone]   ADMISSION INFORMATION:  Place of Service: [place] [address]  Place of Service Code: 21  Inpatient Admission Date/Time: 3/8/23  2:40 PM  Discharge Date/Time: No discharge date for patient encounter  Admitting Diagnosis Code/Description:  Menorrhagia with irregular cycle [N92 1]  Fibroids [D21 9]     UTILIZATION REVIEW CONTACT:  Debi Harrison Utilization   Network Utilization Review Department  Phone: [phone]  Fax: [phone]  Email: [email or web address]  Contact for approvals/pending authorizations, clinical reviews, and discharge  PHYSICIAN ADVISORY SERVICES:  Medical Necessity Denial & [id number] Review  Phone: [phone]  Fax: [phone]  Email: [email or web address]

## 2023-03-09 NOTE — ASSESSMENT & PLAN NOTE
-s/p elective uterine artery embolization on 3/6; pt tolerated procedure well with no complications    -medically clear from IR's perspective, initially planned for d/c on 3/7 but transferred to [facility] given sx of dizziness, weakness    -IR saw pt at bedside on 3/8 for abdominal pain, transitioned pain regimen from ibuprofen and percocet to toradol TID, with tylenol and valentin 2 5/5 ordered as prns      Plan:  · Current pain regimen: toradol TID, prns: tylenol, valentin  · Monitor

## 2023-03-09 NOTE — DISCHARGE SUMMARY
DISCHARGE SUMMARY - Family Medicine Residency, [facility] 1995, 28 y o  female  MRN: [id number]    Unit/Bed#: [facility] 906-01 Encounter: [id number]  Primary Care Provider: Asia Zapata MD      Admission Date: 03/06/23  Discharge Date: 03/09/23  Length of Stay: 1 days  Diagnosis:   Principal Problem:    Dysfunctional uterine bleeding  Active Problems:    Dizziness    Muscle cramping    Rash        ASSESSMENTS & PLANS:   Plans discussed with [facility] team and finalization is pending attending physician attestation      Rash  Assessment & Plan  -improving  -BL, pruritic and tender gluteal rash discovered on 3/8   -Itchy, ddx includes cellulitis or heat rash, allergic reaction    Plan:   · Treat for cellulitis with IV abx, potential allergic reaction with benadryl prn  · IV ancef q8, will consider switching to oral abx   · Blood cx pending    Muscle cramping  Assessment & Plan  -now resolved   -pt with c/o bilateral leg cramping, headache dizziness in context of elective uterine artery embolization on 3/6  -suspect 2/2 dehydration as pt reports she did not eat/drink anything prior to procedure and has not had much to eat or drink since   -CMP: elevated Cl to 112, otherwise electrolytes WNL    Plan:   · Encouraged PO fluid intake  · NS @ 100cc/hr      Dizziness  Assessment & Plan  -improving  -pt with complaint of dizziness s/p elective uterine artery embolization for fibroids on 3/6; suspect 2/2 dehydration as pt reports she was not able to eat or drink prior to the procedure and has not been eating/drinking much since the procedure  -dizziness initially suspected to be caused by toradol given post op, as pt is opioid naive, however dizziness did not resolve after discontinuation of toradol, prompting IR transfer to [facility] on 3/07  -workup for dizziness completed by IR included: ECG on 3/6 (unremarkable, NSR @ 96), CMP on 3/6 (showing elevated Cl @ 110, otherwise electrolytes and kidney fxn WNL)  -no focal deficits appreciated on neuro exam, pt with difficulty moving legs 2/2 cramping   -CBC shows WBC 11 96 (to be expected s/p procedure per IR), otherwise WNL, CMP shows Cl of 112, otherwise WNL    Plan:  · NS at 100cc/hr, encouraged PO hydration   · Check I&O's      * Dysfunctional uterine bleeding  Assessment & Plan  -s/p elective uterine artery embolization on 3/6; pt tolerated procedure well with no complications    -medically clear from IR's perspective, initially planned for d/c on 3/7 but transferred to [facility] given sx of dizziness, weakness    -IR saw pt at bedside on 3/8 for abdominal pain, transitioned pain regimen from ibuprofen and percocet to toradol TID, with tylenol and valentin 2 5/5 ordered as prns      Plan:  · Current pain regimen: toradol TID, prns: tylenol, valentin  · Monitor       Patient Active Problem List   Diagnosis   • Left foot pain   • Overweight (BMI 25 0-29  9)   • Dysfunctional uterine bleeding   • Dizziness   • Muscle cramping   • Rash         HPI (per admission H&P note on 3/7/23)     HPI:   "Darlene Burroughs is a 28 y o  female who presents with past medical history of dysfunctional uterine bleeding secondary to fibroids who presented for uterine artery embolization on 3/6, which was successful with no immediate complications       POD #1 patient noted overall improvement of abdominal and groin pain, though still with some lower abdominal pain  Reported nausea and one episode of vomiting this morning, which improved with Zofran and patient able to tolerate breakfast  Patient reported constant dizziness overnight, which she describes as not being able to focus   She denied any associated double vision, numbness, tingling, weakness  Patient also noted intermittent chest pain and shortness of breath, which she attributed to anxiety attacks in which she becomes nervous when she attempts to fall asleep   Patient was reassessed in the afternoon of POD#1 and she was urinating without difficulty and had relief with thigh pain after Percocet  Patient described bilateral leg tingling, similar to legs falling asleep   Patient unable to hold legs against gravity bilaterally on assessment, though poor effort with exam  She was admitted to the [facility] team for observation overnight for continued dizziness and subjective weakness       Pt seen and examined at bedside  Complains of headache, and intermittent dizziness sensation "like the room is spinning,"  as well as cramping in her bilateral lower legs  States she has not had much to drink since prior to the procedure  "    HOSPITAL COURSE:     Hospital Course:   28 y o  female admitted on 3/6/2023 s/p elective uterine artery embolization for fibroids  She was placed on fluids and pain control on admission for the hospital for suspected dehydration secondary to NPO status over several days  She continued to improve clinically from a pain standpoint  She developed a heat rash vs contact dermatitis on her BL buttox  She was given benadryl and prednisone for symptoms and recommended to continue medrol dose pack as well as benadryl in the op setting  She clinically improved with treatment and was deemed stable for d/c on 3/9/23  She was given strict return to ED precautions for heat rash and instructions to follow up with specialists  On 03/09/23, HD# 1, pt remains stable and is medically cleared for discharge home  COMPLICATIONS:     Complications: NONE       PROCEDURES:     Procedures Performed:   No orders of the defined types were placed in this encounter  SIGNIFICANT FINDINGS / ABNORMAL RESULTS:     Significant Findings/Abnormal Results with this admission:  · Contact dermatitis vs heat rash on BL buttox    IR uterine artery embolization    Result Date: 3/7/2023  Impression: Impression: Uterine artery embolization  Two dominant fibroids predominately supplied by the left uterine artery   Workstation performed: [id number]     VITALS ON DISCHARGE DATE:     Vitals  Blood Pressure: 120/85 (03/09/23 0746)  Temperature: (!) 97 2 °F (36 2 °C) (03/09/23 0746)  Temp Source: Temporal (03/07/23 1419)  Pulse: 83 (03/09/23 0746)  Respirations: 18 (03/09/23 0746)  SpO2: 98 % (03/09/23 0746)  Height: 5' 3" (160 cm) (03/06/23 0915)  Weight - Scale: 76 2 kg (168 lb) (03/06/23 0915)    Temp:  [97 2 °F (36 2 °C)-97 3 °F (36 3 °C)] 97 2 °F (36 2 °C)  HR:  [68-83] 83  Resp:  [18] 18  BP: (120-124)/(79-85) 120/85    Weight (last 2 days)     None          No intake or output data in the 24 hours ending 03/09/23 1121    Invasive Devices     None                   PHYSICAL EXAM ON DAY OF DISCHARGE:     Physical Exam: /85   Pulse 83   Temp (!) 97 2 °F (36 2 °C)   Resp 18   Ht 5' 3" (1 6 m)   Wt 76 2 kg (168 lb)   LMP  (Approximate) Comment: 4 months ago  SpO2 98%   BMI 29 76 kg/m²     Physical Exam  Vitals and nursing note reviewed  Constitutional:       General: She is not in acute distress  Appearance: She is well-developed  HENT:      Head: Normocephalic and atraumatic  Eyes:      Conjunctiva/sclera: Conjunctivae normal    Cardiovascular:      Rate and Rhythm: Normal rate and regular rhythm  Heart sounds: No murmur heard  Pulmonary:      Effort: Pulmonary effort is normal  No respiratory distress  Abdominal:      Palpations: Abdomen is soft  Tenderness: There is abdominal tenderness  Musculoskeletal:         General: No swelling  Cervical back: Neck supple  Skin:     General: Skin is warm and dry  Capillary Refill: Capillary refill takes less than 2 seconds  Findings: Erythema (left buttock) present  Comments: Contact dermatitis that is erythematous and TTP on BL buttock, improved from previous  Neurological:      Mental Status: She is alert  Psychiatric:         Mood and Affect: Mood normal            CODITION AT DISCHARGE:   On day of discharge patient is seen and evaluated at bedside   Patient is stable and without concern  Patient denies any pain or SOB  Patient able to tolerate PO food without N/V/D and had bowel movement and baseline urine output  Patient able to ambulate independently without assistance  Patient is aware of current health status and understand plan of treatment and outpatient follow-up  The patient understood and agreed with the plan  All pertinent lab results, imaging studies, procedures, and/or any incidental findings have been disclosed to the patient  All pertinent questions are answered to patient's satisfaction  On day of discharge, the patient was hemodynamically stable and appropriate for discharge home  Condition at Discharge: stable       DISCHARGE MEDICATIONS:     Discharge Medications:  See after visit summary (AVS) for detailed reconciled discharge medications, which was provided to patient and family  Summary of medication changes made with this admission:    · START:  1  Medrol dose pack, take as prescribed  2  Pain medication as needed for severe pain   3  Tylenol and motrin for mild to moderate pain     · RESUME:  1  All other home medications       FOLLOW-UP APPOINTMENTS / INSTRUCTION :     Important Physician Related Follow Up:     Appointment confirmed:  Future Appointments   Date Time Provider Sarahi Weiss   4/10/2023  3:45 PM [facility] [address]   4/17/2023  3:30 PM Kevin Quach [facility], [address] [place] [address] [facility] DENT [address]   6/12/2023  1:00 PM BRYANNA Sneed  [address] [facility] 62       Discharge instructions/Information to patient and family:   See after visit summary (AVS) for information provided to patient and family  Provisions for Follow-Up Care:  See after visit summary for information related to follow-up care and any pertinent home health orders  DISPOSITION:     Disposition: Home    Discharge Statement   I spent 30 minutes discharging the patient  This time was spent on the day of discharge   I had direct contact with the patient on the day of discharge  Additional documentation is required if more than 30 minutes were spent on discharge  Planned Readmission: Nancy Cabello DO  PGY-2, Family Medicine  03/09/23  11:21 AM    Dear reader, please be aware that portions of my note contain dictated text  I have done my best to proof-read this note prior to signing  However, there may be occasional unnoticed errors pertaining to "sound-alike" words and/or grammar during my dictation process  If there is any words or information that is unclear or appears erroneous, please kindly let me know and I will clarify and/or addend my notes accordingly  Thank you for your understanding

## 2023-03-10 NOTE — UTILIZATION REVIEW
NOTIFICATION OF ADMISSION DISCHARGE   This is a Notification of Discharge from [facility]  Please be advised that this patient has been discharge from our facility  Below you will find the admission and discharge date and time including the patient’s disposition  UTILIZATION REVIEW CONTACT:  Kennedy Garcia  Utilization   Network Utilization Review Department  Phone: [phone] x carefully listen to the prompts  All voicemails are confidential   Email: [email or web address]     ADMISSION INFORMATION  PRESENTATION DATE: 3/6/2023  2:12 PM  OBERVATION ADMISSION DATE:   INPATIENT ADMISSION DATE: 3/8/23  2:40 PM   DISCHARGE DATE: 3/9/2023  1:51 PM   DISPOSITION:Home/Self Care    IMPORTANT INFORMATION:  Send all requests for admission clinical reviews, approved or denied determinations and any other requests to dedicated fax number below belonging to the campus where the patient is receiving treatment   List of dedicated fax numbers:  [address] DENIALS (Administrative/Medical Necessity) [phone]   [address] (Maternity/NICU/Pediatrics) [phone]   [facility] [phone]   [facility] 87 [phone]   [address] [phone]   220 [facility] [phone]   90 [facility] [phone]   1463 [facility] 119 [phone]   [facility]  [phone]   405 [facility] [phone]   412 [facility] 850 [facility] [phone]

## 2023-03-10 NOTE — TELEPHONE ENCOUNTER
IR Clinic Follow-Up:    Patient clinical visit in after MRI June 2023    Schedule visit for the first available IR Physician: No                *If no, schedule for:   IR Physician: Linsey    Type of Visit: doximity video     Additional Details:     F/u after fibroid embo [place]

## 2023-03-13 ENCOUNTER — TELEMEDICINE (OUTPATIENT)
Dept: INTERVENTIONAL RADIOLOGY/VASCULAR | Facility: CLINIC | Age: 28
End: 2023-03-13

## 2023-03-13 DIAGNOSIS — Z98.890 STATUS POST EMBOLIZATION OF UTERINE ARTERY: ICD-10-CM

## 2023-03-13 DIAGNOSIS — N93.8 DYSFUNCTIONAL UTERINE BLEEDING: Primary | ICD-10-CM

## 2023-03-13 LAB
BACTERIA BLD CULT: NORMAL
BACTERIA BLD CULT: NORMAL

## 2023-03-13 NOTE — LETTER
March 14, 2023     [place], [address]  [address] [place]    Patient: Abilio Merida   YOB: 1995   Date of Visit: 3/13/2023       Dear Dr Rory Card: Thank you for referring Lam Nelson to me for consultatoin  We performed uterine artery embolization last week  The patient required several days in the hospital for pain control but is now home and recovering well  We will follow her up with MRI  Thank you for the consultation       Sincerely,        Tucker Shi MD        CC: MD Tucker Miller MD  3/14/2023  9:16 AM  Sign when Signing Visit  Virtual Brief Visit    Patient is located in the following state in which I hold an active license [place]      Assessment/Plan:    S/p [place] for fibroids, performed March 6 2023  She required a stay for several nights due to pain and poor PO intake  I called her today to follow up, with phone   She feels 'well' , much improved since discharge  She reports that the Pain is only mild  She has no aly bleeding - only  some vaginal spotting  Her appetite is improving  With regards to her Bowel movements - constipation over, she was having bowel movements yesterday  The rash on gluteal area is improving, skin is now peeling  She believes it is improving  Perhaps this was a medication allergy or local dermatitis  She does report that she had a 'fever' from the waist down yesterday, some nonspecific symptoms of unsteadiness  Otherwise she is ambulating on her 2nd floor apartment  She has not gone out yet  Feels small pulsation in groin, but no pain, No black blue  Often thin patients will palpate the vascular closure device, I have low concern for pseudoaneurysm  When asked to quantify she states she is 80% better over the last two days  I communicated that for any pelvic pressure, high fevers to call Gyn or IR       Otherwise our plan is to obtain follow-up MRI in approximately 3 months to assess for fibroid necrosis/evolution  We will see the patient in clinic  Problem List Items Addressed This Visit        Genitourinary    Dysfunctional uterine bleeding - Primary   Other Visit Diagnoses     Status post embolization of uterine artery              Recent Visits  Date Type Provider Dept   03/09/23 Telephone Johnson Lester MD [facility] recent visits within past 7 days and meeting all other requirements  Today's Visits  Date Type Provider Dept   03/13/23 Puja Loco MD [facility]   Showing today's visits and meeting all other requirements  Future Appointments  No visits were found meeting these conditions    Showing future appointments within next 150 days and meeting all other requirements         Visit Time    Total Visit Duration: 23 min including

## 2023-03-13 NOTE — PROGRESS NOTES
Virtual Brief Visit    Patient is located in the following state in which I hold an active license [place]      Assessment/Plan:    S/p [facility] for fibroids, performed March 6 2023  She required a stay for several nights due to pain and poor PO intake  I called her today to follow up, with phone   She feels 'well' , much improved since discharge  She reports that the Pain is only mild  She has no aly bleeding - only  some vaginal spotting  Her appetite is improving  With regards to her Bowel movements - constipation over, she was having bowel movements yesterday  The rash on gluteal area is improving, skin is now peeling  She believes it is improving  Perhaps this was a medication allergy or local dermatitis  She does report that she had a 'fever' from the waist down yesterday, some nonspecific symptoms of unsteadiness  Otherwise she is ambulating on her 2nd floor apartment  She has not gone out yet  Feels small pulsation in groin, but no pain, No black blue  Often thin patients will palpate the vascular closure device, I have low concern for pseudoaneurysm  When asked to quantify she states she is 80% better over the last two days  I communicated that for any pelvic pressure, high fevers to call Gyn or IR  Otherwise our plan is to obtain follow-up MRI in approximately 3 months to assess for fibroid necrosis/evolution  We will see the patient in clinic          Problem List Items Addressed This Visit        Genitourinary    Dysfunctional uterine bleeding - Primary   Other Visit Diagnoses     Status post embolization of uterine artery              Recent Visits  Date Type Provider Dept   03/09/23 Telephone MD Marco A Moser recent visits within past 7 days and meeting all other requirements  Today's Visits  Date Type Provider Dept   03/13/23 Telemedicine MD Marco A Moser today's visits and meeting all other requirements  Future Appointments  No visits were found meeting these conditions    Showing future appointments within next 150 days and meeting all other requirements         Visit Time    Total Visit Duration: 23 min including

## 2023-03-14 ENCOUNTER — OFFICE VISIT (OUTPATIENT)
Dept: OBGYN CLINIC | Facility: CLINIC | Age: 28
End: 2023-03-14

## 2023-03-14 VITALS
SYSTOLIC BLOOD PRESSURE: 114 MMHG | BODY MASS INDEX: 28.92 KG/M2 | HEART RATE: 69 BPM | DIASTOLIC BLOOD PRESSURE: 79 MMHG | WEIGHT: 163.2 LBS | HEIGHT: 63 IN

## 2023-03-14 DIAGNOSIS — R10.2 PELVIC PAIN: ICD-10-CM

## 2023-03-14 DIAGNOSIS — D25.1 INTRAMURAL AND SUBMUCOUS LEIOMYOMA OF UTERUS: Primary | ICD-10-CM

## 2023-03-14 DIAGNOSIS — D25.0 INTRAMURAL AND SUBMUCOUS LEIOMYOMA OF UTERUS: Primary | ICD-10-CM

## 2023-03-14 RX ORDER — NAPROXEN 500 MG/1
500 TABLET ORAL 2 TIMES DAILY WITH MEALS
Qty: 30 TABLET | Refills: 1 | Status: SHIPPED | OUTPATIENT
Start: 2023-03-14 | End: 2023-03-24

## 2023-03-14 NOTE — PROGRESS NOTES
PROBLEM GYNECOLOGICAL VISIT    Марина Paniagua is a 28 y o  female who presents today for followup  Her general medical history has been reviewed and she reports it as follows:    No past medical history on file  Past Surgical History:   Procedure Laterality Date   • IR UTERINE ARTERY EMBOLIZATION  3/6/2023     OB History        0    Para   0    Term   0       0    AB   0    Living   0       SAB   0    IAB   0    Ectopic   0    Multiple   0    Live Births   0               Social History     Tobacco Use   • Smoking status: Never   • Smokeless tobacco: Never   Vaping Use   • Vaping Use: Never used   Substance Use Topics   • Alcohol use: Never   • Drug use: Never     Social History     Substance and Sexual Activity   Sexual Activity Not Currently   • Partners: Male       Current Outpatient Medications   Medication Instructions   • docusate sodium (COLACE) 50 mg, Oral, 2 times daily PRN   • ibuprofen (MOTRIN) 600 mg, Oral, Every 8 hours PRN   • medroxyPROGESTERone (PROVERA) 10 mg, Oral, Daily   • methylPREDNISolone 4 MG tablet therapy pack Use as directed on package   • miSOPROStol (Cytotec) 200 mcg tablet Erasmo douglas tableta por via oral la noche anterior al prcedimiento y douglas tableta en la vagina la manana del procedimiento a las 5:30am   • naproxen (NAPROSYN) 500 mg, Oral, 2 times daily with meals   • norethindrone-ethinyl estradiol (Junel ) 1-20 MG-MCG per tablet 1 tablet, Oral, Daily   • omeprazole (PRILOSEC) 20 mg, Oral, Every morning, Take every morning before breakfast for 7 days   • ondansetron (ZOFRAN) 4 mg, Oral, Every 8 hours PRN   • oxyCODONE-acetaminophen (Percocet) 5-325 mg per tablet 1 tablet, Oral, Every 6 hours PRN   • senna (SENOKOT) 8 6 mg, Oral, Daily at bedtime PRN       History of Present Illness:   Patient presents for followup s/p [facility] [place] for fibroid uterus was recently hospitalized for severe pelvic pain which has been controlled with motrin   Patient states the motrin pills are really big in size  Review of Systems:  Review of Systems   Genitourinary: Positive for pelvic pain  All other systems reviewed and are negative  Physical Exam:  /79   Pulse 69   Ht 5' 3" (1 6 m)   Wt 74 kg (163 lb 3 2 oz)   LMP 11/07/2022   BMI 28 91 kg/m²   Physical Exam  Constitutional:       Appearance: Normal appearance  Neurological:      Mental Status: She is alert  Vitals reviewed  Assessment:   1  S/p [place]    Plan:   1  Naprosyn escribed   2  Return to office 4mos  3  Patient's depression screening was assessed with a PHQ-2 score of 0  Their PHQ-9 score was 0  Clinically patient does not have depression  No treatment is required  Reviewed with patient that test results are available in MyChart immediately, but that they will not necessarily be reviewed by me immediately  Explained that I will review results at my earliest opportunity and contact patient appropriately

## 2023-03-14 NOTE — PATIENT INSTRUCTIONS
[place] por moran confianza en nuestro equipo  Brenton Bilis y agradecemos zahira comentarios  Si recibe douglas encuesta nuestra, tómese unos momentos para informarnos cómo estamos     Sinceramente,  Nadeem Ortiz, DO

## 2023-04-20 PROBLEM — N93.9 ABNORMAL UTERINE BLEEDING (AUB): Status: ACTIVE | Noted: 2023-03-06

## 2023-04-21 ENCOUNTER — TELEPHONE (OUTPATIENT)
Dept: INTERVENTIONAL RADIOLOGY/VASCULAR | Facility: CLINIC | Age: 28
End: 2023-04-21

## 2023-04-21 ENCOUNTER — TELEPHONE (OUTPATIENT)
Dept: OBGYN CLINIC | Facility: CLINIC | Age: 28
End: 2023-04-21

## 2023-04-21 NOTE — TELEPHONE ENCOUNTER
Pt recently to [facility]    Then ED visit    Nothing to suggest infection - I spoke to care team  I did not speak to patient    D/w Dr Luis Antonio Nieves:  outpt MRI then IR visit  F/u with GYN is planned

## 2023-04-24 DIAGNOSIS — D25.1 INTRAMURAL AND SUBMUCOUS LEIOMYOMA OF UTERUS: ICD-10-CM

## 2023-04-24 DIAGNOSIS — R10.2 PELVIC PAIN: ICD-10-CM

## 2023-04-24 DIAGNOSIS — D25.0 INTRAMURAL AND SUBMUCOUS LEIOMYOMA OF UTERUS: ICD-10-CM

## 2023-04-24 RX ORDER — NAPROXEN 500 MG/1
500 TABLET ORAL 2 TIMES DAILY WITH MEALS
Qty: 60 TABLET | Refills: 1 | Status: SHIPPED | OUTPATIENT
Start: 2023-04-24 | End: 2023-06-23

## 2023-05-09 ENCOUNTER — OFFICE VISIT (OUTPATIENT)
Dept: DENTISTRY | Facility: CLINIC | Age: 28
End: 2023-05-09

## 2023-05-09 VITALS — DIASTOLIC BLOOD PRESSURE: 77 MMHG | TEMPERATURE: 98.9 F | SYSTOLIC BLOOD PRESSURE: 113 MMHG | HEART RATE: 86 BPM

## 2023-05-09 DIAGNOSIS — K03.6 ACCRETIONS ON TEETH: Primary | ICD-10-CM

## 2023-05-09 PROBLEM — K05.30 PERIODONTITIS: Status: ACTIVE | Noted: 2023-05-09

## 2023-05-09 NOTE — DENTAL PROCEDURE DETAILS
Adult Prophy    ASA I  Pain: 0  Reviewed M/DH     Exams:  not needed  Xrays:    none  Type of Treatment:  Adult Prophy - used Ultrasonic and Hand scaling,  Polished, Flossed  Reviewed OHI  Brush:  2X/day and Floss 1X/day  EO/OCS Exams:  No significant findings  IO: No significant findings  Occlusion:  post ortho treatment  Oral Hygiene:  Good-Fair   Plaque:  Light    Calculus:   Moderate  /  Bleeding:  Light - Moderate   Gingiva:  Pink  / Firm  / Stippled/  slightly Inflamed lingual molars  Stain:  Light          Periocharting was completed last visit  Perio Findings:  stage 1 Grade A  Caries Findings:  charted  Caries Risk Assessment:       caries risk  high  Treatment Plan:  Not updated    Referral:  No referral given   NV:  6 Month Recall   Needs wade work on # 19 ASAP  may need endo

## 2023-06-16 NOTE — TELEPHONE ENCOUNTER
Spoke with pt and provided her CS phone number to schedule MRI  Informed pt I will call her early next week to schedule follow up with Dr Olga Lidia Silver following MRI

## 2023-06-19 ENCOUNTER — ANNUAL EXAM (OUTPATIENT)
Dept: OBGYN CLINIC | Facility: CLINIC | Age: 28
End: 2023-06-19

## 2023-06-19 ENCOUNTER — OFFICE VISIT (OUTPATIENT)
Dept: FAMILY MEDICINE CLINIC | Facility: CLINIC | Age: 28
End: 2023-06-19

## 2023-06-19 ENCOUNTER — APPOINTMENT (OUTPATIENT)
Dept: LAB | Facility: CLINIC | Age: 28
End: 2023-06-19
Payer: COMMERCIAL

## 2023-06-19 VITALS
WEIGHT: 171 LBS | BODY MASS INDEX: 30.3 KG/M2 | HEIGHT: 63 IN | RESPIRATION RATE: 22 BRPM | TEMPERATURE: 98.3 F | SYSTOLIC BLOOD PRESSURE: 110 MMHG | OXYGEN SATURATION: 98 % | HEART RATE: 93 BPM | DIASTOLIC BLOOD PRESSURE: 60 MMHG

## 2023-06-19 VITALS
DIASTOLIC BLOOD PRESSURE: 76 MMHG | SYSTOLIC BLOOD PRESSURE: 117 MMHG | HEART RATE: 83 BPM | BODY MASS INDEX: 30.23 KG/M2 | WEIGHT: 170.6 LBS | HEIGHT: 63 IN

## 2023-06-19 DIAGNOSIS — N93.9 ABNORMAL UTERINE BLEEDING (AUB): ICD-10-CM

## 2023-06-19 DIAGNOSIS — N93.8 DYSFUNCTIONAL UTERINE BLEEDING: ICD-10-CM

## 2023-06-19 DIAGNOSIS — R11.2 NAUSEA AND VOMITING, UNSPECIFIED VOMITING TYPE: Primary | ICD-10-CM

## 2023-06-19 DIAGNOSIS — R11.2 NAUSEA AND VOMITING, UNSPECIFIED VOMITING TYPE: ICD-10-CM

## 2023-06-19 DIAGNOSIS — Z01.419 ENCOUNTER FOR ANNUAL ROUTINE GYNECOLOGICAL EXAMINATION: Primary | ICD-10-CM

## 2023-06-19 LAB
ERYTHROCYTE [DISTWIDTH] IN BLOOD BY AUTOMATED COUNT: 11.9 % (ref 11.6–15.1)
HCT VFR BLD AUTO: 42.5 % (ref 34.8–46.1)
HGB BLD-MCNC: 13.6 G/DL (ref 11.5–15.4)
MCH RBC QN AUTO: 31.6 PG (ref 26.8–34.3)
MCHC RBC AUTO-ENTMCNC: 32 G/DL (ref 31.4–37.4)
MCV RBC AUTO: 99 FL (ref 82–98)
PLATELET # BLD AUTO: 345 THOUSANDS/UL (ref 149–390)
PMV BLD AUTO: 10.5 FL (ref 8.9–12.7)
RBC # BLD AUTO: 4.31 MILLION/UL (ref 3.81–5.12)
WBC # BLD AUTO: 6.73 THOUSAND/UL (ref 4.31–10.16)

## 2023-06-19 PROCEDURE — 99213 OFFICE O/P EST LOW 20 MIN: CPT | Performed by: FAMILY MEDICINE

## 2023-06-19 PROCEDURE — 36415 COLL VENOUS BLD VENIPUNCTURE: CPT

## 2023-06-19 PROCEDURE — S0612 ANNUAL GYNECOLOGICAL EXAMINA: HCPCS | Performed by: NURSE PRACTITIONER

## 2023-06-19 PROCEDURE — 86677 HELICOBACTER PYLORI ANTIBODY: CPT

## 2023-06-19 PROCEDURE — 85027 COMPLETE CBC AUTOMATED: CPT

## 2023-06-19 RX ORDER — SUCRALFATE 1 G/1
1 TABLET ORAL 4 TIMES DAILY
Qty: 120 TABLET | Refills: 1 | Status: SHIPPED | OUTPATIENT
Start: 2023-06-19

## 2023-06-19 RX ORDER — NORETHINDRONE ACETATE AND ETHINYL ESTRADIOL AND FERROUS FUMARATE 1MG-20(24)
1 KIT ORAL DAILY
Qty: 28 TABLET | Refills: 12 | Status: SHIPPED | OUTPATIENT
Start: 2023-06-19

## 2023-06-19 RX ORDER — OMEPRAZOLE 40 MG/1
40 CAPSULE, DELAYED RELEASE ORAL
Qty: 90 CAPSULE | Refills: 3 | Status: SHIPPED | OUTPATIENT
Start: 2023-06-19 | End: 2024-06-13

## 2023-06-19 RX ORDER — ONDANSETRON HYDROCHLORIDE 8 MG/1
8 TABLET, FILM COATED ORAL EVERY 8 HOURS PRN
Qty: 90 TABLET | Refills: 0 | Status: SHIPPED | OUTPATIENT
Start: 2023-06-19

## 2023-06-19 NOTE — PATIENT INSTRUCTIONS
Continue birth control pills as previously prescribed  [place] Line [phone]  Call with needs or concerns  Birth Control Pills   WHAT YOU NEED TO KNOW:   Birth control pills are also called oral contraceptives, or the pill  It is medicine that helps prevent pregnancy  Birth control pills work by preventing ovulation  Ovulation is when the ovaries make and release an egg cell each month  If this egg gets fertilized by sperm, pregnancy occurs  Birth control pills may also help to prevent pregnancy by keeping sperm from fertilizing an egg  DISCHARGE INSTRUCTIONS:   Follow up with your healthcare provider as directed:  Write down your questions so you remember to ask them during your visits  Advantages of birth control pills:  When birth control pills are used correctly, the chances of getting pregnant are very low  Birth control pills may help decrease bleeding and pain during your monthly period  They may also help prevent cancer of the uterus and ovaries  Disadvantages of birth control pills: You may have sudden changes in your mood or feelings while you take birth control pills  You may have nausea and decreased sex drive  You may have an increased appetite and rapid weight gain  You may also have bleeding in between periods, less frequent periods, vaginal dryness, and breast pain  Birth control pills will not protect you from sexually transmitted infections  Rarely, some birth control pills can increase your risk for a blood clot  This may become life-threatening  If you want to get pregnant: If you are planning to have a baby, ask your healthcare provider when you may stop taking your birth control pills  It may take some time for you to start ovulating again  Ask your healthcare provider for more information about pregnancy after birth control pills  When to start taking birth control pills after you have a baby:   If you are not breastfeeding, you may start taking birth control pills 3 weeks after you give birth  You may be able to take certain types of birth control pills if you are breastfeeding  These pills can be started from 6 weeks to 6 months after you give birth  Ask your healthcare provider for more information about when to start taking birth control pills after you give birth  Contact your healthcare provider if:   You have forgotten to take a birth control pill  You have mood changes, such as depression, since starting birth control pills  You have nausea or you are vomiting  You have severe abdominal pain  You missed a period and have questions or concerns about being pregnant  You still have bleeding 4 months after taking birth control pills correctly  You have questions or concerns about your condition or care  Return to the emergency department if:   Your arm or leg feels warm, tender, and painful  It may look swollen and red  You feel lightheaded, short of breath, and have chest pain  You cough up blood  You have any of the following signs of a stroke:      Numbness or drooping on one side of your face     Weakness in an arm or leg    Confusion or difficulty speaking    Dizziness, a severe headache, or vision loss    You have severe pain, numbness, or swelling in your arms or legs  © 2017 [facility] Information is for End User's use only and may not be sold, redistributed or otherwise used for commercial purposes  All illustrations and images included in CareNotes® are the copyrighted property of A D A M , Inc  or [employer]  The above information is an  only  It is not intended as medical advice for individual conditions or treatments  Talk to your doctor, nurse or pharmacist before following any medical regimen to see if it is safe and effective for you  Missed or Late Pills: For combined (Estrogen and Progestin) pills:    If one hormonal pill is LATE (<24 hours since a pill should have been taken):  Take the late or missed pill as soon as possible  Continue taking the remaining pills at the usual time (even if it means taking two pills on the same day)  No additional contraceptive protection is needed  Emergency contraception is not usually needed but can be considered if hormonal pills were missed earlier in the cycle or in the last week of the previous cycle  If one hormonal pill has been MISSED (24 to <48 hours since a pill should have been taken): Take the late or missed pill as soon as possible  Continue taking the remaining pills at the usual time (even if it means taking two pills on the same day)  No additional contraceptive protection is needed  Emergency contraception is not usually needed but can be considered if hormonal pills were missed earlier in the cycle or in the last week of the previous cycle  If two or more consecutive hormonal pills have been missed: (less than or equal to 48 hours since a pill should have been taken): Take the most recent missed pill as soon as possible  (Any other missed pills should be discarded ) Continue taking the remaining pills at the usual time (even if it means taking two pills on the same day)  Use back-up contraception (e g , condoms) or avoid sexual intercourse until hormonal pills have been taken for 7 consecutive days  If pills were missed in the last week of hormonal pills (e g , days 15-21 for 28-day pill packs): Omit the hormone-free interval by finishing the horomal pills in the current pack and starting a new pack the next day  If unable to start a new pack immediately, use back-up contraception (e g , condoms) or avoid sexual intercourse until hormonal pills from a new pack have been taken for 7 consecutive days  Emergency contraception should be considered if hormonal pills were missed during the first week and unprotected sexual intercourse occurred in the previous 5 days  Emergency contraception may also be considered at other times as appropriate  Source: [place] S  Selected Practice Recommendations for Contraceptive Use, 2013

## 2023-06-19 NOTE — PROGRESS NOTES
Annual Exam    Assessment   1  Encounter for annual routine gynecological examination        2  Abnormal uterine bleeding (AUB)  norethindrone-ethinyl estradiol-ferrous fumarate (Junel Fe 24) 1-20 MG-MCG(24) per tablet        well woman       Plan       All questions answered  Breast self exam technique reviewed and patient encouraged to perform self-exam monthly  Contraception: OCP (estrogen/progesterone)  Discussed healthy lifestyle modifications  Follow up in 1 year  Patient Instructions   Continue birth control pills as previously prescribed  [facility] [phone]  Call with needs or concerns  Pt verbalized understanding of all discussed  Subjective      Maki Farrar is a 33 y o  [place] female who presents for annual well woman exam  Periods are regular every 28-30 days, lasting 2 days  No intermenstrual bleeding, spotting, or discharge  Last WNL PAP was 2021   Hx of uterine artery embolization 3/6/2023, for a Hx of fibroid and AUB, pt states periods arevery light now and only 1 time per month  1 partner x 3 years, denies domestic violence     Depression Screening Follow-up Plan: Patient's depression screening was negative with a PHQ-2 score of 0  Their PHQ-9 score was 0  Clinically patient does not have depression  No treatment is required  Current contraception: OCP (estrogen/progesterone)  History of abnormal Pap smear: no  Family history of uterine or ovarian cancer: yes - MotherSandy provided  Regular self breast exam: yes  History of abnormal mammogram: N/A  Family history of breast cancer: no  History of abnormal lipids: unknown  Menstrual History:  OB History        0    Para   0    Term   0       0    AB   0    Living   0       SAB   0    IAB   0    Ectopic   0    Multiple   0    Live Births   0                Menarche age: 14  Patient's last menstrual period was 2023         The following portions of the patient's history were reviewed and updated "as appropriate: allergies, current medications, past family history, past medical history, past social history, past surgical history and problem list     Review of Systems  Pertinent items are noted in HPI        Objective      /76 (BP Location: Left arm, Patient Position: Sitting, Cuff Size: Standard)   Pulse 83   Ht 5' 3\" (1 6 m)   Wt 77 4 kg (170 lb 9 6 oz)   LMP 06/16/2023   BMI 30 22 kg/m²     General: alert and oriented, in no acute distress, alert, appears stated age and cooperative   Heart: regular rate and rhythm, S1, S2 normal, no murmur, click, rub or gallop   Lungs: clear to auscultation bilaterally, WNL respiratory effort, negative cough or SOB   Thyroid: Negative masses   Abdomen: soft, non-tender, without masses or organomegaly   Vulva: normal   Vagina: normal mucosa   Cervix: no cervical motion tenderness and no lesions   Uterus: normal size, non-tender, normal shape and consistency, Hx of fibroids)   Adnexa: normal adnexa   Urethra: normal   Breasts: NT,negative masses, discharge, or dimpling             "

## 2023-06-19 NOTE — PROGRESS NOTES
Name: Cheri Guillen      : 1995      MRN: [id number]  Encounter Provider: Del Powell MD  Encounter Date: 2023   Encounter department: [address] [place] [address]     1  Nausea and vomiting, unspecified vomiting type  -     H  Pylori IgM, IgG, IgA Ab; Future  -     H  pylori antibody, IgG; Future  -     Helicobacter Pylori,IgM; Future  -     sucralfate (CARAFATE) 1 g tablet; Take 1 tablet (1 g total) by mouth 4 (four) times a day (Patient not taking: Reported on 2023)  -     omeprazole (PriLOSEC) 40 MG capsule; Take 1 capsule (40 mg total) by mouth daily before breakfast (Patient not taking: Reported on 2023)  -     ondansetron (ZOFRAN) 8 mg tablet; Take 1 tablet (8 mg total) by mouth every 8 (eight) hours as needed for nausea or vomiting (Patient not taking: Reported on 2023)         Counseled on diet  Eat at least 3 hours prior to bedtime   Drink sips of fluids at a time, avoid gulping down large amounts of fluid  If H pylori shows acute infection will treat if not will refer to GI for further workup if no improvement with PPI and Carafate   Urine HCG negative in the office   Subjective      Vomiting   This is a new problem  The current episode started 1 to 4 weeks ago  The problem occurs less than 2 times per day  The problem has been unchanged  The emesis has an appearance of stomach contents  There has been no fever  Treatments tried: Pepto bismouth Omeprazole  Review of Systems   Gastrointestinal: Positive for nausea and vomiting  All other systems reviewed and are negative        Current Outpatient Medications on File Prior to Visit   Medication Sig   • docusate sodium (COLACE) 50 mg capsule Take 1 capsule (50 mg total) by mouth 2 (two) times a day as needed for constipation (Patient not taking: Reported on 2023)   • ibuprofen (MOTRIN) 600 mg tablet Take 1 tablet (600 mg total) by mouth every 8 (eight) hours as needed for "mild pain or moderate pain (Patient not taking: Reported on 4/17/2023)   • miSOPROStol (Cytotec) 200 mcg tablet Erasmo douglas tableta por via oral la noche anterior al prcedimiento y [place] tableta en la vagina la manana del procedimiento a las 5:30am (Patient not taking: Reported on 3/14/2023)   • naproxen (Naprosyn) 500 mg tablet Take 1 tablet (500 mg total) by mouth 2 (two) times a day with meals (Patient not taking: Reported on 6/19/2023)   • norethindrone-ethinyl estradiol-ferrous fumarate (Junel Fe 24) 1-20 MG-MCG(24) per tablet Take 1 tablet by mouth daily   • senna (SENOKOT) 8 6 mg Take 1 tablet (8 6 mg total) by mouth daily at bedtime as needed for constipation (Patient not taking: Reported on 4/19/2023)   • [DISCONTINUED] methylPREDNISolone 4 MG tablet therapy pack Use as directed on package (Patient not taking: Reported on 3/14/2023)   • [DISCONTINUED] norethindrone-ethinyl estradiol-ferrous fumarate (Junel Fe 24) 1-20 MG-MCG(24) per tablet Take 1 tablet by mouth daily   • [DISCONTINUED] omeprazole (PriLOSEC) 20 mg delayed release capsule Take 1 capsule (20 mg total) by mouth every morning for 7 days Take every morning before breakfast for 7 days   • [DISCONTINUED] ondansetron (ZOFRAN) 4 mg tablet Take 1 tablet (4 mg total) by mouth every 8 (eight) hours as needed for nausea or vomiting (Patient not taking: Reported on 4/19/2023)       Objective     /60 (BP Location: Left arm, Patient Position: Sitting, Cuff Size: Standard)   Pulse 93   Temp 98 3 °F (36 8 °C) (Temporal)   Resp 22   Ht 5' 3\" (1 6 m)   Wt 77 6 kg (171 lb)   LMP 06/16/2023   SpO2 98%   BMI 30 29 kg/m²     Physical Exam  Vitals and nursing note reviewed  Constitutional:       Appearance: She is well-developed  HENT:      Head: Normocephalic        Right Ear: External ear normal       Left Ear: External ear normal       Nose: Nose normal    Eyes:      Conjunctiva/sclera: Conjunctivae normal       Pupils: Pupils are equal, round, and " reactive to light  Neck:      Thyroid: No thyromegaly  Cardiovascular:      Rate and Rhythm: Normal rate and regular rhythm  Heart sounds: Normal heart sounds  Pulmonary:      Effort: Pulmonary effort is normal       Breath sounds: Normal breath sounds  Abdominal:      Palpations: Abdomen is soft  Tenderness: There is no abdominal tenderness  There is no guarding or rebound  Musculoskeletal:         General: Normal range of motion  Cervical back: Normal range of motion and neck supple  Skin:     General: Skin is dry  Neurological:      Mental Status: She is alert and oriented to person, place, and time  Deep Tendon Reflexes: Reflexes are normal and symmetric         Pa Elliott MD

## 2023-06-20 LAB
H PYLORI IGG SER IA-ACNC: 0.43 INDEX VALUE (ref 0–0.79)
H PYLORI IGM SER-ACNC: <9 UNITS (ref 0–8.9)

## 2023-06-21 LAB — MISCELLANEOUS LAB TEST RESULT: NORMAL

## 2023-06-23 ENCOUNTER — TELEPHONE (OUTPATIENT)
Dept: FAMILY MEDICINE CLINIC | Facility: CLINIC | Age: 28
End: 2023-06-23

## 2023-07-03 ENCOUNTER — HOSPITAL ENCOUNTER (OUTPATIENT)
Facility: MEDICAL CENTER | Age: 28
Discharge: HOME | End: 2023-07-03
Attending: RADIOLOGY
Payer: COMMERCIAL

## 2023-07-03 DIAGNOSIS — D21.9 FIBROIDS: ICD-10-CM

## 2023-07-03 PROCEDURE — 72197 MRI PELVIS W/O & W/DYE: CPT

## 2023-07-03 PROCEDURE — A9585 GADOBUTROL INJECTION: HCPCS | Performed by: RADIOLOGY

## 2023-07-03 PROCEDURE — G1004 CDSM NDSC: HCPCS

## 2023-07-03 RX ADMIN — GADOBUTROL 7 ML: 604.72 INJECTION INTRAVENOUS at 09:33

## 2023-07-10 ENCOUNTER — OFFICE VISIT (OUTPATIENT)
Dept: FAMILY MEDICINE CLINIC | Facility: CLINIC | Age: 28
End: 2023-07-10

## 2023-07-10 VITALS
TEMPERATURE: 97.7 F | WEIGHT: 164 LBS | DIASTOLIC BLOOD PRESSURE: 70 MMHG | BODY MASS INDEX: 29.06 KG/M2 | HEIGHT: 63 IN | SYSTOLIC BLOOD PRESSURE: 120 MMHG | OXYGEN SATURATION: 97 % | RESPIRATION RATE: 16 BRPM | HEART RATE: 88 BPM

## 2023-07-10 DIAGNOSIS — K59.01 SLOW TRANSIT CONSTIPATION: ICD-10-CM

## 2023-07-10 DIAGNOSIS — R21 RASH AND NONSPECIFIC SKIN ERUPTION: Primary | ICD-10-CM

## 2023-07-10 PROCEDURE — 99213 OFFICE O/P EST LOW 20 MIN: CPT | Performed by: FAMILY MEDICINE

## 2023-07-10 RX ORDER — DIAPER,BRIEF,INFANT-TODD,DISP
EACH MISCELLANEOUS 2 TIMES DAILY
Qty: 30 G | Refills: 0 | Status: SHIPPED | OUTPATIENT
Start: 2023-07-10

## 2023-07-10 NOTE — PROGRESS NOTES
Name: Peggy Saunders      : 1995      MRN: [id number]  Encounter Provider: Margaux Tom MD  Encounter Date: 7/10/2023   Encounter department: [facility]     1. Rash and nonspecific skin eruption  -     hydrocortisone 1 % ointment; Apply topically 2 (two) times a day    2. Slow transit constipation  -     docusate sodium (COLACE) 50 mg capsule; Take 1 capsule (50 mg total) by mouth 2 (two) times a day as needed for constipation           Subjective      32 yo  female here today for follow up of nausea and vomiting  Symptoms have improved significantly on Omeprazole and Carafate. Has had 2 episodes of vomiting over the last 3 weeks  Currently complains of non pruritic rash on her face and constipation with having to strain to have a BM every 2 to 3 days     Review of Systems   Gastrointestinal: Positive for constipation. All other systems reviewed and are negative.       Current Outpatient Medications on File Prior to Visit   Medication Sig   • ibuprofen (MOTRIN) 600 mg tablet Take 1 tablet (600 mg total) by mouth every 8 (eight) hours as needed for mild pain or moderate pain (Patient not taking: Reported on 2023)   • miSOPROStol (Cytotec) 200 mcg tablet Erasmo douglas tableta por via oral la noche anterior al prcedimiento y [place] tableta en la vagina la manana del procedimiento a las 5:30am (Patient not taking: Reported on 3/14/2023)   • naproxen (Naprosyn) 500 mg tablet Take 1 tablet (500 mg total) by mouth 2 (two) times a day with meals (Patient not taking: Reported on 2023)   • norethindrone-ethinyl estradiol-ferrous fumarate (Junel Fe 24) 1-20 MG-MCG(24) per tablet Take 1 tablet by mouth daily   • omeprazole (PriLOSEC) 40 MG capsule Take 1 capsule (40 mg total) by mouth daily before breakfast (Patient not taking: Reported on 2023)   • ondansetron (ZOFRAN) 8 mg tablet Take 1 tablet (8 mg total) by mouth every 8 (eight) hours as needed for nausea or vomiting (Patient not taking: Reported on 6/19/2023)   • senna (SENOKOT) 8.6 mg Take 1 tablet (8.6 mg total) by mouth daily at bedtime as needed for constipation (Patient not taking: Reported on 4/19/2023)   • sucralfate (CARAFATE) 1 g tablet Take 1 tablet (1 g total) by mouth 4 (four) times a day (Patient not taking: Reported on 6/19/2023)   • [DISCONTINUED] docusate sodium (COLACE) 50 mg capsule Take 1 capsule (50 mg total) by mouth 2 (two) times a day as needed for constipation (Patient not taking: Reported on 4/17/2023)       Objective     /70 (BP Location: Right arm, Patient Position: Sitting, Cuff Size: Large)   Pulse 88   Temp 97.7 °F (36.5 °C) (Temporal)   Resp 16   Ht 5' 3" (1.6 m)   Wt 74.4 kg (164 lb)   LMP 06/16/2023   SpO2 97%   BMI 29.05 kg/m²     Physical Exam  Vitals and nursing note reviewed. Constitutional:       Appearance: She is well-developed. HENT:      Head: Normocephalic. Right Ear: External ear normal.      Left Ear: External ear normal.      Nose: Nose normal.   Eyes:      Conjunctiva/sclera: Conjunctivae normal.      Pupils: Pupils are equal, round, and reactive to light. Neck:      Thyroid: No thyromegaly. Cardiovascular:      Rate and Rhythm: Normal rate and regular rhythm. Heart sounds: Normal heart sounds. Pulmonary:      Effort: Pulmonary effort is normal.      Breath sounds: Normal breath sounds. Abdominal:      Palpations: Abdomen is soft. Tenderness: There is no abdominal tenderness. There is no guarding or rebound. Musculoskeletal:         General: Normal range of motion. Cervical back: Normal range of motion and neck supple. Skin:     General: Skin is dry. Findings: Rash present. Neurological:      Mental Status: She is alert and oriented to person, place, and time. Deep Tendon Reflexes: Reflexes are normal and symmetric.        Jeffry Tate MD

## 2023-07-18 ENCOUNTER — OFFICE VISIT (OUTPATIENT)
Dept: DENTISTRY | Facility: CLINIC | Age: 28
End: 2023-07-18

## 2023-07-18 VITALS — HEART RATE: 87 BPM | DIASTOLIC BLOOD PRESSURE: 78 MMHG | TEMPERATURE: 97.5 F | SYSTOLIC BLOOD PRESSURE: 113 MMHG

## 2023-07-18 DIAGNOSIS — K02.9 CARIES INVOLVING MULTIPLE SURFACES OF TOOTH: ICD-10-CM

## 2023-07-18 DIAGNOSIS — K02.52 CARIES LESION, OCCLUSAL SURFACE, MODERATE, ACTIVE: Primary | ICD-10-CM

## 2023-07-18 PROCEDURE — D0220 INTRAORAL - PERIAPICAL FIRST RADIOGRAPHIC IMAGE: HCPCS | Performed by: DENTIST

## 2023-07-18 PROCEDURE — D2391 RESIN-BASED COMPOSITE - 1 SURFACE, POSTERIOR: HCPCS | Performed by: DENTIST

## 2023-07-19 NOTE — PROGRESS NOTES
Composite Filling    B[place] presents for composite filling. PMH reviewed, no changes. ASA:I  Pain score: 0  Chief complain: tooth filling. Oral exam: Patient presents for filling of tooth #19 with DOL deep caries. Reviewed treatment plan and took new PA radiograph for tooth #19. Upon examination, tooth #19 has extensive deep caries extending to pulp as well as subgingival level, perio probing at distal defect is around 4mm. Soft decay is evident at distal root and sensitive on probing. Discussed with patient possible need of RCT, crown lengthening, build up and full coverage crown restoration VS extraction and implant restoration. Patient aware and understood. Patient reported she is deciding for extraction and implant replacement. Offered patient to provide a restoration of tooth #3 occlusal instead for today and patient approved. Tooth #3 was treatment plan for occlusal filling due to caries. Discussed with patient need for RCT if pulp exposure occurs or in future if pulp is inflamed. Pt understands and consents. Applied topical benzocaine, administered one carps of 1.7 ML 2% lido 1:100k epi via maxillary infiltration injection. Prepped tooth #3 occlusal with 245 carbide on high speed. Caries removed with round carbide on slow speed. Isolation with cotton rolls and dri-angles    Etch with 37% H2PO4, rinse, dry. Applied Adhese with 20 second scrub once, gentle air dry and light cured for 10s. Restored with Tetric bulk dayana shade A2 and light cured. Refined with finishing burs, polished with enhance point. Verified occlusion and contacts. Postop instruction is given to patient. Pt left satisfied and ambulatory. NV: Restoration tooth #20 MOD/Follow up with [facility] for implant consultation.

## 2023-07-25 ENCOUNTER — OFFICE VISIT (OUTPATIENT)
Dept: OBGYN CLINIC | Facility: CLINIC | Age: 28
End: 2023-07-25

## 2023-07-25 VITALS
HEIGHT: 63 IN | SYSTOLIC BLOOD PRESSURE: 119 MMHG | HEART RATE: 85 BPM | BODY MASS INDEX: 29.09 KG/M2 | WEIGHT: 164.2 LBS | DIASTOLIC BLOOD PRESSURE: 76 MMHG

## 2023-07-25 DIAGNOSIS — Z71.2 ENCOUNTER TO DISCUSS TEST RESULTS: Primary | ICD-10-CM

## 2023-07-25 DIAGNOSIS — Z30.40 ENCOUNTER FOR SURVEILLANCE OF CONTRACEPTIVES, UNSPECIFIED CONTRACEPTIVE: ICD-10-CM

## 2023-07-25 PROCEDURE — 99213 OFFICE O/P EST LOW 20 MIN: CPT | Performed by: OBSTETRICS & GYNECOLOGY

## 2023-07-25 NOTE — PATIENT INSTRUCTIONS
[place] por moran confianza en nuestro equipo. Wili Eisenmenger y agradecemos zahira comentarios. Si recibe douglas encuesta nuestra, tómese unos momentos para informarnos cómo estamos.    Sinceramente,  [facility], DO

## 2023-07-25 NOTE — PROGRESS NOTES
PROBLEM GYNECOLOGICAL VISIT    Jose Hayes is a 33 y.o. female who presents today for follow up. Her general medical history has been reviewed and she reports it as follows:    Past Medical History:   Diagnosis Date   • Fibroids      Past Surgical History:   Procedure Laterality Date   • IR UTERINE ARTERY EMBOLIZATION  3/6/2023     OB History        0    Para   0    Term   0       0    AB   0    Living   0       SAB   0    IAB   0    Ectopic   0    Multiple   0    Live Births   0               Social History     Tobacco Use   • Smoking status: Never   • Smokeless tobacco: Never   Vaping Use   • Vaping Use: Never used   Substance Use Topics   • Alcohol use: Never   • Drug use: Never     Social History     Substance and Sexual Activity   Sexual Activity Yes   • Partners: Female   • Birth control/protection: Pill       Current Outpatient Medications   Medication Instructions   • docusate sodium (COLACE) 50 mg, Oral, 2 times daily PRN   • hydrocortisone 1 % ointment Topical, 2 times daily   • ibuprofen (MOTRIN) 600 mg, Oral, Every 8 hours PRN   • miSOPROStol (Cytotec) 200 mcg tablet Erasmo douglas tableta por via oral la noche anterior al prcedimiento y douglas tableta en la vagina la manana del procedimiento a las 5:30am   • naproxen (NAPROSYN) 500 mg, Oral, 2 times daily with meals   • norethindrone-ethinyl estradiol-ferrous fumarate (Junel Fe 24) 1-20 MG-MCG(24) per tablet 1 tablet, Oral, Daily   • omeprazole (PRILOSEC) 40 mg, Oral, Daily before breakfast   • ondansetron (ZOFRAN) 8 mg, Oral, Every 8 hours PRN   • senna (SENOKOT) 8.6 mg, Oral, Daily at bedtime PRN   • sucralfate (CARAFATE) 1 g, Oral, 4 times daily       History of Present Illness:   Patient presents for follow up s/p trial of OCP and pelvic pain from [place] with no new complaints. Patient states pain has resolved and is doing good with the OCP.     Review of Systems:  Review of Systems   All other systems reviewed and are negative. Physical Exam:  /76   Pulse 85   Ht 5' 3" (1.6 m)   Wt 74.5 kg (164 lb 3.2 oz)   LMP 07/12/2023   BMI 29.09 kg/m²   Physical Exam  Constitutional:       Appearance: Normal appearance. Neurological:      Mental Status: She is alert. Vitals reviewed. Assessment:   1. Fibroid uterus   2. S/P [place]    Plan:   1. Continue with OCP   2. Return to office prn. Reviewed with patient that test results are available in [facility] immediately, but that they will not necessarily be reviewed by me immediately. Explained that I will review results at my earliest opportunity and contact patient appropriately.

## 2023-08-01 ENCOUNTER — TELEMEDICINE (OUTPATIENT)
Dept: INTERVENTIONAL RADIOLOGY/VASCULAR | Facility: CLINIC | Age: 28
End: 2023-08-01
Payer: COMMERCIAL

## 2023-08-01 DIAGNOSIS — D21.9 FIBROID: ICD-10-CM

## 2023-08-01 DIAGNOSIS — N93.9 ABNORMAL UTERINE BLEEDING (AUB): Primary | ICD-10-CM

## 2023-08-01 PROCEDURE — 99214 OFFICE O/P EST MOD 30 MIN: CPT | Performed by: RADIOLOGY

## 2023-08-01 NOTE — LETTER
August 3, 2023     Margie Hardy, [address]  [address]    Patient: Amena Cornell   YOB: 1995   Date of Visit: 8/1/2023       Dear Dr. Jj Diamond: Thank you for referring Grisel Vergara to me for evaluation. Below are my notes for this consultation. If you have questions, please do not hesitate to call me. I look forward to following your patient along with you. Sincerely,        Whitney Najera MD        CC: No Recipients    Whitney Najera MD  8/3/2023  9:53 PM  Sign when Signing Visit  Virtual Regular Visit    Verification of patient location:    Patient is located at Home in the following state in which I hold an active license [place]      Assessment/Plan:    Domingo Gomez woman with pelvic pain and abnormal bleeding who underwent uterine fibroid embolization. March 6 2023 she was admitted for dizziness  And had some chest pain postprocedure but otherwise recovered well. Did have ED visit April 23 for pelvic pain, ultimately with imaging performed and nothing to suggest infection    She now has obtained follow-up MRI as previously recommended and returns to discuss imaging. With regards to her bleeding -   Is now 'normal'  Menstruates once a month, 2-3 days bleeding  No bleeding between periods  No discharge    With regards to her pain -   No more pelvic pain  It is 'gone'    I asked How has this affected her life  She says her back pain resolved  'Much much better'    Overall she is very happy. She is still taking OCPs  She does have general nonspecific plans for children in the future     We reviewed images from her MRI together.   There is excellent response throughout the fibroids but not complete response  The large superior fibroid I estimate  100% ischemia  The dominant intramural fibroid - I estimate 80% ischemia  I discussed that she should expect continued shrinkage over time but that long-term documented volume reduction for completely infarcted fibroids is approximately 50%. This may be enough to solve symptomatology. We discussed the fact that there is a known long-term failure rate of uterine artery embolization in the literature approaching 30% over time. Since she is young it should be safe to assume that at some point she will need further procedures for recurrent or resistant fibroids. If she has recurrence of pain or abnormal bleeding we will obtain repeat imaging. And there is always consideration of repeat intervention if required    At this point I will not schedule a specific follow-up appointment with IR. She will continue to follow with her gynecology team. She can call us at any time. Referral to assist this patient greatly appreciated. Cervix sagital      Problem List Items Addressed This Visit          Genitourinary    Abnormal uterine bleeding (AUB) - Primary     Other Visit Diagnoses       Fibroid                     Reason for visit is f/u procedure uterine artery embolization    Encounter provider Aaron Abad MD    Provider located at [address] [place], [place] [id number] [phone]      Recent Visits  No visits were found meeting these conditions. Showing recent visits within past 7 days and meeting all other requirements  Today's Visits  Date Type Provider Dept   08/01/23 Telemedicine Aaron Abad MD [facility]   Showing today's visits and meeting all other requirements  Future Appointments  No visits were found meeting these conditions. Showing future appointments within next 150 days and meeting all other requirements       The patient was identified by name and date of birth. Lenore Franco was informed that this is a telemedicine visit and that the visit is being conducted [facility] . Porter Benitez My office door was closed. No one else was in the room.   She acknowledged consent and understanding of privacy and security of the video platform. The patient has agreed to participate and understands they can discontinue the visit at any time. Patient is aware this is a billable service. Riley Bartlett is a 33 y.o. female returning for follow-up after uterine artery embolization.       HPI     Past Medical History:   Diagnosis Date   • Fibroids        Past Surgical History:   Procedure Laterality Date   • IR UTERINE ARTERY EMBOLIZATION  3/6/2023       Current Outpatient Medications   Medication Sig Dispense Refill   • docusate sodium (COLACE) 50 mg capsule Take 1 capsule (50 mg total) by mouth 2 (two) times a day as needed for constipation (Patient not taking: Reported on 7/25/2023) 30 capsule 0   • hydrocortisone 1 % ointment Apply topically 2 (two) times a day (Patient not taking: Reported on 7/25/2023) 30 g 0   • ibuprofen (MOTRIN) 600 mg tablet Take 1 tablet (600 mg total) by mouth every 8 (eight) hours as needed for mild pain or moderate pain (Patient not taking: Reported on 4/17/2023) 30 tablet 0   • miSOPROStol (Cytotec) 200 mcg tablet Erasmo douglas tableta por via oral la noche anterior al prcedimiento y [place] tableta en la vagina la manana del procedimiento a las 5:30am (Patient not taking: Reported on 3/14/2023) 2 tablet 0   • naproxen (Naprosyn) 500 mg tablet Take 1 tablet (500 mg total) by mouth 2 (two) times a day with meals (Patient not taking: Reported on 6/19/2023) 60 tablet 1   • norethindrone-ethinyl estradiol-ferrous fumarate (Junel Fe 24) 1-20 MG-MCG(24) per tablet Take 1 tablet by mouth daily 28 tablet 12   • omeprazole (PriLOSEC) 40 MG capsule Take 1 capsule (40 mg total) by mouth daily before breakfast (Patient not taking: Reported on 6/19/2023) 90 capsule 3   • ondansetron (ZOFRAN) 8 mg tablet Take 1 tablet (8 mg total) by mouth every 8 (eight) hours as needed for nausea or vomiting (Patient not taking: Reported on 6/19/2023) 90 tablet 0   • senna (SENOKOT) 8.6 mg Take 1 tablet (8.6 mg total) by mouth daily at bedtime as needed for constipation (Patient not taking: Reported on 4/19/2023) 10 tablet 0   • sucralfate (CARAFATE) 1 g tablet Take 1 tablet (1 g total) by mouth 4 (four) times a day (Patient not taking: Reported on 6/19/2023) 120 tablet 1     No current facility-administered medications for this visit. No Known Allergies    Review of Systems    Video Exam    There were no vitals filed for this visit.     Physical Exam     Visit Time  Total Visit Duration: 33 minutes

## 2023-08-01 NOTE — PROGRESS NOTES
Virtual Regular Visit    Verification of patient location:    Patient is located at Home in the following state in which I hold an active license [place]      Assessment/Plan:    Tej Fairchild woman with pelvic pain and abnormal bleeding who underwent uterine fibroid embolization. March 6 2023 she was admitted for dizziness  And had some chest pain postprocedure but otherwise recovered well. Did have ED visit April 23 for pelvic pain, ultimately with imaging performed and nothing to suggest infection    She now has obtained follow-up MRI as previously recommended and returns to discuss imaging. With regards to her bleeding -   Is now 'normal'  Menstruates once a month, 2-3 days bleeding  No bleeding between periods  No discharge    With regards to her pain -   No more pelvic pain  It is 'gone'    I asked How has this affected her life  She says her back pain resolved  'Much much better'    Overall she is very happy. She is still taking OCPs  She does have general nonspecific plans for children in the future     We reviewed images from her MRI together. There is excellent response throughout the fibroids but not complete response  The large superior fibroid I estimate  100% ischemia  The dominant intramural fibroid - I estimate 80% ischemia  I discussed that she should expect continued shrinkage over time but that long-term documented volume reduction for completely infarcted fibroids is approximately 50%. This may be enough to solve symptomatology. We discussed the fact that there is a known long-term failure rate of uterine artery embolization in the literature approaching 30% over time. Since she is young it should be safe to assume that at some point she will need further procedures for recurrent or resistant fibroids. If she has recurrence of pain or abnormal bleeding we will obtain repeat imaging.   And there is always consideration of repeat intervention if required    At this point I will not schedule a specific follow-up appointment with IR. She will continue to follow with her gynecology team. She can call us at any time. Referral to assist this patient greatly appreciated. Cervix sagital      Problem List Items Addressed This Visit        Genitourinary    Abnormal uterine bleeding (AUB) - Primary   Other Visit Diagnoses     Fibroid                   Reason for visit is f/u procedure uterine artery embolization    Encounter provider Bouchra Padilla MD    Provider located at [address] [place] [id number] [phone]      Recent Visits  No visits were found meeting these conditions. Showing recent visits within past 7 days and meeting all other requirements  Today's Visits  Date Type Provider Dept   08/01/23 Telemedicine Bouchra Padilla MD [facility]\""   Showing today's visits and meeting all other requirements  Future Appointments  No visits were found meeting these conditions. Showing future appointments within next 150 days and meeting all other requirements       The patient was identified by name and date of birth. Roselia Bartlett was informed that this is a telemedicine visit and that [facility] . Alf Joseph My office door was closed. No one else was in the room. She acknowledged consent and understanding of privacy and security of the video platform. The patient has agreed to participate and understands they can discontinue the visit at any time. Patient is aware this is a billable service. Subjective  Roselia Bartlett is a 33 y.o. female returning for follow-up after uterine artery embolization.       HPI     Past Medical History:   Diagnosis Date   • Fibroids        Past Surgical History:   Procedure Laterality Date   • IR UTERINE ARTERY EMBOLIZATION  3/6/2023       Current Outpatient Medications   Medication Sig Dispense Refill   • docusate sodium (COLACE) 50 mg capsule Take 1 capsule (50 mg total) by mouth 2 (two) times a day as needed for constipation (Patient not taking: Reported on 7/25/2023) 30 capsule 0   • hydrocortisone 1 % ointment Apply topically 2 (two) times a day (Patient not taking: Reported on 7/25/2023) 30 g 0   • ibuprofen (MOTRIN) 600 mg tablet Take 1 tablet (600 mg total) by mouth every 8 (eight) hours as needed for mild pain or moderate pain (Patient not taking: Reported on 4/17/2023) 30 tablet 0   • miSOPROStol (Cytotec) 200 mcg tablet Erasmo douglas tableta por via oral la noche anterior al prcedimiento y [place] tableta en la vagina la manana del procedimiento a las 5:30am (Patient not taking: Reported on 3/14/2023) 2 tablet 0   • naproxen (Naprosyn) 500 mg tablet Take 1 tablet (500 mg total) by mouth 2 (two) times a day with meals (Patient not taking: Reported on 6/19/2023) 60 tablet 1   • norethindrone-ethinyl estradiol-ferrous fumarate (Junel Fe 24) 1-20 MG-MCG(24) per tablet Take 1 tablet by mouth daily 28 tablet 12   • omeprazole (PriLOSEC) 40 MG capsule Take 1 capsule (40 mg total) by mouth daily before breakfast (Patient not taking: Reported on 6/19/2023) 90 capsule 3   • ondansetron (ZOFRAN) 8 mg tablet Take 1 tablet (8 mg total) by mouth every 8 (eight) hours as needed for nausea or vomiting (Patient not taking: Reported on 6/19/2023) 90 tablet 0   • senna (SENOKOT) 8.6 mg Take 1 tablet (8.6 mg total) by mouth daily at bedtime as needed for constipation (Patient not taking: Reported on 4/19/2023) 10 tablet 0   • sucralfate (CARAFATE) 1 g tablet Take 1 tablet (1 g total) by mouth 4 (four) times a day (Patient not taking: Reported on 6/19/2023) 120 tablet 1     No current facility-administered medications for this visit. No Known Allergies    Review of Systems    Video Exam    There were no vitals filed for this visit.     Physical Exam     Visit Time  Total Visit Duration: 33 minutes

## 2023-08-17 DIAGNOSIS — R11.2 NAUSEA AND VOMITING, UNSPECIFIED VOMITING TYPE: ICD-10-CM

## 2023-08-17 RX ORDER — SUCRALFATE 1 G/1
1 TABLET ORAL 4 TIMES DAILY
Qty: 120 TABLET | Refills: 1 | OUTPATIENT
Start: 2023-08-17

## 2023-09-05 ENCOUNTER — OFFICE VISIT (OUTPATIENT)
Dept: DENTISTRY | Facility: CLINIC | Age: 28
End: 2023-09-05

## 2023-09-05 DIAGNOSIS — K02.9 TOOTH DECAY: Primary | ICD-10-CM

## 2023-09-05 PROCEDURE — D2393 RESIN-BASED COMPOSITE - 3 SURFACES, POSTERIOR: HCPCS | Performed by: DENTIST

## 2023-09-05 NOTE — PROGRESS NOTES
Composite Filling    Yudith Rivera presents for composite filling. PMH reviewed, no changes. ASA I  Pain level: none    Discussed with patient need for RCT if pulp exposure occurs or in future if pulp is inflamed. Pt understands and consents. Applied topical benzocaine, administered 1 carps 2% lido 1:100k epi via IANB LL side and 0.5 ml of a carp 4% articaine 1:100k epi via Buccal infiltrn in reln to #20    Prepped tooth # 20(MOD) with 245 carbide on high speed. Placed tofflemire/palodent matrix. Isolation with cotton rolls and dri-angles  Prep mod. Deep, placed limelite, cured. Etch with 37% H2PO4, rinse, dry. Applied Adhese with 20 second scrub once, gentle air dry and light cured for 10s. Restored with Tetric bulk dayana shade A2 and light cured. Refined with finishing burs, polished with enhance point. Verified occlusion and contacts. POI given . Pt. States she will return for extrn #19, ref to OS (difficult extrn, see [place] )   Pt left satisfied.   Nv: fills LR side

## 2023-09-08 ENCOUNTER — OFFICE VISIT (OUTPATIENT)
Dept: DENTISTRY | Facility: CLINIC | Age: 28
End: 2023-09-08

## 2023-09-08 ENCOUNTER — TELEPHONE (OUTPATIENT)
Dept: FAMILY MEDICINE CLINIC | Facility: CLINIC | Age: 28
End: 2023-09-08

## 2023-09-08 VITALS — DIASTOLIC BLOOD PRESSURE: 76 MMHG | SYSTOLIC BLOOD PRESSURE: 113 MMHG | TEMPERATURE: 96.8 F | HEART RATE: 91 BPM

## 2023-09-08 DIAGNOSIS — K02.9 DENTAL CARIES: Primary | ICD-10-CM

## 2023-09-08 PROCEDURE — D2392 RESIN-BASED COMPOSITE - 2 SURFACES, POSTERIOR: HCPCS

## 2023-09-08 NOTE — DENTAL PROCEDURE DETAILS
Patient presents for a dental restoration and verbally consents for treatment:  Reviewed health history-  Pt is ASA type II  Treatment consents signed: Yes  Perio: Gingivitis  Pain Scale: 0  Caries Assessment: High    Radiographs: Films are current  Oral Hygiene instruction reviewed and given  Hygiene recall visits recommended to the patient    Patient agrees with the diagnosis of Caries and the proposed treatment plan for the resin restoration:  Tooth ##29  Dental Anesthesia:  1 carp 2% lidocaine w/ 1:100,000 epi via IANB and 1 carp 4% articaine w/ 1:100,000 epi via buccal and lingual inflitration  Material:   Etch Ivoclar bond and resin   Shade: Shade A2    Prognosis is Good.    Referrals Needed: No  Next visit: Cold test #19, #19 DOL

## 2023-09-08 NOTE — TELEPHONE ENCOUNTER
09/08/23    FORM: MEDICAL INFORMATION / MRO    FAX # [phone]    CONFIRMED THAT IT WAS RECEIVED ON 09/07/23

## 2023-09-12 ENCOUNTER — OFFICE VISIT (OUTPATIENT)
Dept: DENTISTRY | Facility: CLINIC | Age: 28
End: 2023-09-12

## 2023-09-12 VITALS — SYSTOLIC BLOOD PRESSURE: 116 MMHG | HEART RATE: 89 BPM | DIASTOLIC BLOOD PRESSURE: 81 MMHG

## 2023-09-12 DIAGNOSIS — K02.9 CARIES LESION, APPROXIMAL SURFACE, D3: ICD-10-CM

## 2023-09-12 DIAGNOSIS — K04.1 NECROSIS OF DENTAL PULP: Primary | ICD-10-CM

## 2023-09-12 PROCEDURE — D2331 RESIN-BASED COMPOSITE - 2 SURFACES, ANTERIOR: HCPCS | Performed by: DENTIST

## 2023-09-12 NOTE — DENTAL PROCEDURE DETAILS
Composite Filling #6 DL  Referral to Endo Tooth #19     Kristie Leblanc presents for composite filling. Patient consent treatment. PMH reviewed, no changes. ASA: I  Pain score: 0  Chief complain: none. Discussed with patient need for RCT if pulp exposure occurs or in future if pulp is inflamed. Pt understands and consents. Applied topical benzocaine, administered one carps 4% Articaine 1:100k epi via maxillary infiltration injection. Prepped tooth #6 DL with 245 luisana on high speed. Caries removed with round carbide on slow speed. Placed tofflemire matrix and wedge. Isolation with cotton rolls and dri-angles    Etch with 37% H2PO4, rinse, dry. Applied Adhese with 20 second scrub once, gentle air dry and light cured for 10s. Restored with Tetric bulk dayana shade A2 and light cured. Refined with finishing burs, polished with enhance point. Verified occlusion and contacts. POI is given. Pt left satisfied and ambulatory. NOTE: patient reported she decided to undergo treatment of tooth #19 as was dicussed previously on 7/18/2023 for RCT, crown lengthening procedure, build up and crown restoration rather than extracting the tooth. Patient is aware of need of all mentioned procedures. Patient approved treatment. Referred to endo for RCT of tooth #19. Preauth is sent for crown lengthening, build up and crown restorations.      NV: Restoration tooth #4

## 2023-09-13 ENCOUNTER — OFFICE VISIT (OUTPATIENT)
Dept: DENTISTRY | Facility: CLINIC | Age: 28
End: 2023-09-13

## 2023-09-13 ENCOUNTER — TELEPHONE (OUTPATIENT)
Dept: DENTISTRY | Facility: CLINIC | Age: 28
End: 2023-09-13

## 2023-09-13 VITALS — DIASTOLIC BLOOD PRESSURE: 74 MMHG | HEART RATE: 80 BPM | SYSTOLIC BLOOD PRESSURE: 107 MMHG

## 2023-09-13 DIAGNOSIS — K02.62 CARIES OF DENTIN: Primary | ICD-10-CM

## 2023-09-13 PROCEDURE — D2392 RESIN-BASED COMPOSITE - 2 SURFACES, POSTERIOR: HCPCS

## 2023-09-13 NOTE — DENTAL PROCEDURE DETAILS
Composite Filling    Ollie Stevens is a 32 y/o F that presents for #4-DO composite filling. PMH reviewed, no changes: ASA II.  used: ID [id number]. Discussed with patient need for RCT if pulp exposure occurs or in future if pulp is inflamed. Pt understands and consents. Applied topical benzocaine, administered 1 carps 4% articaine 1:100k epi via buccal local infiltration. Prepped tooth #4-DO with 245 carbide on high speed. Caries removed with round carbide on slow speed. Placed Carey matrix. Isolation with cotton rolls and DryShield. Etch with 37% H2PO4, rinse, dry. Applied Adhese with 20 second scrub once, gentle air dry and light cured for 10s. Restored with Tetric flowable and  bulk dayana shade A2 and light cured. Refined with finishing burs, polished with enhance point. Verified occlusion and contacts. Patient left satisfied and ambulatory.    Attending: Dr. Travis Landis  NV: #28-DO resin

## 2023-09-18 ENCOUNTER — OFFICE VISIT (OUTPATIENT)
Dept: DENTISTRY | Facility: CLINIC | Age: 28
End: 2023-09-18

## 2023-09-18 VITALS — TEMPERATURE: 97.1 F | SYSTOLIC BLOOD PRESSURE: 113 MMHG | DIASTOLIC BLOOD PRESSURE: 79 MMHG | HEART RATE: 94 BPM

## 2023-09-18 DIAGNOSIS — K08.50 DEFECTIVE DENTAL RESTORATION: Primary | ICD-10-CM

## 2023-09-18 PROCEDURE — D2392 RESIN-BASED COMPOSITE - 2 SURFACES, POSTERIOR: HCPCS | Performed by: DENTIST

## 2023-09-18 NOTE — DENTAL PROCEDURE DETAILS
Composite Filling #28 DO    Dio Ambriz presents for composite filling. Patient consent treatment. PMH reviewed, no changes. ASA: II  Pain score: 0  Chief complain: "need a filling" . DX: Tooth #28 existing defective composite filling/chipped//open contact. Discussed with patient need for RCT if pulp exposure occurs or in future if pulp is inflamed. Pt understands and consents. Applied topical benzocaine, administered half one carps 2% lido 1:100k epi via mandibular infiltration injection. Prepped tooth #28 DO with 245 luisana on high speed. Caries removed with round carbide on slow speed. Placed Carey matrix. Isolation with cotton rolls and dri-angles    Etch with 37% H2PO4, rinse, dry. Applied Adhese with 20 second scrub once, gentle air dry and light cured for 10s. Restored with Tetric bulk dayana shade A2 and light cured. Refined with finishing burs, polished with enhance point. Verified occlusion and contacts. POI is given. Pt left satisfied and ambulatory. NV: Restoration tooth #30.

## 2023-09-22 ENCOUNTER — OFFICE VISIT (OUTPATIENT)
Dept: DENTISTRY | Facility: CLINIC | Age: 28
End: 2023-09-22

## 2023-09-22 VITALS — HEART RATE: 91 BPM | TEMPERATURE: 98.8 F | DIASTOLIC BLOOD PRESSURE: 84 MMHG | SYSTOLIC BLOOD PRESSURE: 124 MMHG

## 2023-09-22 DIAGNOSIS — K02.9 CARIES LESION, APPROXIMAL SURFACE, D2: Primary | ICD-10-CM

## 2023-09-22 PROCEDURE — D2392 RESIN-BASED COMPOSITE - 2 SURFACES, POSTERIOR: HCPCS | Performed by: DENTIST

## 2023-10-25 ENCOUNTER — TELEPHONE (OUTPATIENT)
Dept: DENTISTRY | Facility: CLINIC | Age: 28
End: 2023-10-25